# Patient Record
Sex: MALE | Race: BLACK OR AFRICAN AMERICAN | Employment: UNEMPLOYED | URBAN - METROPOLITAN AREA
[De-identification: names, ages, dates, MRNs, and addresses within clinical notes are randomized per-mention and may not be internally consistent; named-entity substitution may affect disease eponyms.]

---

## 2019-02-24 ENCOUNTER — APPOINTMENT (OUTPATIENT)
Dept: CT IMAGING | Age: 54
DRG: 309 | End: 2019-02-24
Attending: EMERGENCY MEDICINE
Payer: OTHER GOVERNMENT

## 2019-02-24 ENCOUNTER — HOSPITAL ENCOUNTER (INPATIENT)
Age: 54
LOS: 4 days | Discharge: HOME | DRG: 309 | End: 2019-02-28
Attending: EMERGENCY MEDICINE | Admitting: INTERNAL MEDICINE
Payer: OTHER GOVERNMENT

## 2019-02-24 ENCOUNTER — APPOINTMENT (OUTPATIENT)
Dept: ULTRASOUND IMAGING | Age: 54
DRG: 309 | End: 2019-02-24
Attending: INTERNAL MEDICINE
Payer: OTHER GOVERNMENT

## 2019-02-24 DIAGNOSIS — R07.9 CHEST PAIN, UNSPECIFIED TYPE: ICD-10-CM

## 2019-02-24 DIAGNOSIS — N17.9 ACUTE RENAL FAILURE, UNSPECIFIED ACUTE RENAL FAILURE TYPE (HCC): ICD-10-CM

## 2019-02-24 DIAGNOSIS — I48.92 ATRIAL FLUTTER, UNSPECIFIED TYPE (HCC): Primary | ICD-10-CM

## 2019-02-24 DIAGNOSIS — R55 SYNCOPE AND COLLAPSE: ICD-10-CM

## 2019-02-24 PROBLEM — I10 UNCONTROLLED HYPERTENSION: Chronic | Status: ACTIVE | Noted: 2019-02-24

## 2019-02-24 LAB
ALBUMIN SERPL-MCNC: 3.3 G/DL (ref 3.5–5)
ALBUMIN/GLOB SERPL: 0.7 {RATIO} (ref 1.1–2.2)
ALP SERPL-CCNC: 81 U/L (ref 45–117)
ALT SERPL-CCNC: 42 U/L (ref 12–78)
AMPHET UR QL SCN: NEGATIVE
ANION GAP SERPL CALC-SCNC: 8 MMOL/L (ref 5–15)
APPEARANCE UR: CLEAR
AST SERPL-CCNC: 21 U/L (ref 15–37)
BACTERIA URNS QL MICRO: NEGATIVE /HPF
BARBITURATES UR QL SCN: NEGATIVE
BASOPHILS # BLD: 0.1 K/UL (ref 0–0.1)
BASOPHILS NFR BLD: 1 % (ref 0–1)
BENZODIAZ UR QL: NEGATIVE
BILIRUB SERPL-MCNC: 0.2 MG/DL (ref 0.2–1)
BILIRUB UR QL: NEGATIVE
BUN SERPL-MCNC: 16 MG/DL (ref 6–20)
BUN/CREAT SERPL: 10 (ref 12–20)
CALCIUM SERPL-MCNC: 8.6 MG/DL (ref 8.5–10.1)
CANNABINOIDS UR QL SCN: POSITIVE
CHLORIDE SERPL-SCNC: 103 MMOL/L (ref 97–108)
CO2 SERPL-SCNC: 27 MMOL/L (ref 21–32)
COCAINE UR QL SCN: NEGATIVE
COLOR UR: ABNORMAL
COMMENT, HOLDF: NORMAL
CREAT SERPL-MCNC: 1.68 MG/DL (ref 0.7–1.3)
DIFFERENTIAL METHOD BLD: NORMAL
DRUG SCRN COMMENT,DRGCM: ABNORMAL
EOSINOPHIL # BLD: 0.2 K/UL (ref 0–0.4)
EOSINOPHIL NFR BLD: 3 % (ref 0–7)
EPITH CASTS URNS QL MICRO: ABNORMAL /LPF
ERYTHROCYTE [DISTWIDTH] IN BLOOD BY AUTOMATED COUNT: 14.5 % (ref 11.5–14.5)
ETHANOL SERPL-MCNC: <10 MG/DL
GLOBULIN SER CALC-MCNC: 4.7 G/DL (ref 2–4)
GLUCOSE SERPL-MCNC: 113 MG/DL (ref 65–100)
GLUCOSE UR STRIP.AUTO-MCNC: NEGATIVE MG/DL
HCT VFR BLD AUTO: 38.2 % (ref 36.6–50.3)
HGB BLD-MCNC: 12.5 G/DL (ref 12.1–17)
HGB UR QL STRIP: NEGATIVE
HYALINE CASTS URNS QL MICRO: ABNORMAL /LPF (ref 0–5)
IMM GRANULOCYTES # BLD AUTO: 0 K/UL (ref 0–0.04)
IMM GRANULOCYTES NFR BLD AUTO: 0 % (ref 0–0.5)
KETONES UR QL STRIP.AUTO: NEGATIVE MG/DL
LEUKOCYTE ESTERASE UR QL STRIP.AUTO: ABNORMAL
LYMPHOCYTES # BLD: 2.9 K/UL (ref 0.8–3.5)
LYMPHOCYTES NFR BLD: 33 % (ref 12–49)
MAGNESIUM SERPL-MCNC: 1.9 MG/DL (ref 1.6–2.4)
MCH RBC QN AUTO: 27.8 PG (ref 26–34)
MCHC RBC AUTO-ENTMCNC: 32.7 G/DL (ref 30–36.5)
MCV RBC AUTO: 84.9 FL (ref 80–99)
METHADONE UR QL: NEGATIVE
MONOCYTES # BLD: 0.7 K/UL (ref 0–1)
MONOCYTES NFR BLD: 8 % (ref 5–13)
NEUTS SEG # BLD: 5 K/UL (ref 1.8–8)
NEUTS SEG NFR BLD: 56 % (ref 32–75)
NITRITE UR QL STRIP.AUTO: NEGATIVE
NRBC # BLD: 0 K/UL (ref 0–0.01)
NRBC BLD-RTO: 0 PER 100 WBC
OPIATES UR QL: NEGATIVE
PCP UR QL: NEGATIVE
PH UR STRIP: 6 [PH] (ref 5–8)
PLATELET # BLD AUTO: 199 K/UL (ref 150–400)
PMV BLD AUTO: 11.7 FL (ref 8.9–12.9)
POTASSIUM SERPL-SCNC: 4.3 MMOL/L (ref 3.5–5.1)
PROT SERPL-MCNC: 8 G/DL (ref 6.4–8.2)
PROT UR STRIP-MCNC: NEGATIVE MG/DL
RBC # BLD AUTO: 4.5 M/UL (ref 4.1–5.7)
RBC #/AREA URNS HPF: ABNORMAL /HPF (ref 0–5)
SAMPLES BEING HELD,HOLD: NORMAL
SODIUM SERPL-SCNC: 138 MMOL/L (ref 136–145)
SP GR UR REFRACTOMETRY: <1.005 (ref 1–1.03)
TROPONIN I BLD-MCNC: <0.04 NG/ML (ref 0–0.08)
TSH SERPL DL<=0.05 MIU/L-ACNC: 1.1 UIU/ML (ref 0.36–3.74)
UR CULT HOLD, URHOLD: NORMAL
UROBILINOGEN UR QL STRIP.AUTO: 1 EU/DL (ref 0.2–1)
WBC # BLD AUTO: 8.9 K/UL (ref 4.1–11.1)
WBC URNS QL MICRO: ABNORMAL /HPF (ref 0–4)

## 2019-02-24 PROCEDURE — 80053 COMPREHEN METABOLIC PANEL: CPT

## 2019-02-24 PROCEDURE — 87086 URINE CULTURE/COLONY COUNT: CPT

## 2019-02-24 PROCEDURE — 81001 URINALYSIS AUTO W/SCOPE: CPT

## 2019-02-24 PROCEDURE — 96374 THER/PROPH/DIAG INJ IV PUSH: CPT

## 2019-02-24 PROCEDURE — 74011250636 HC RX REV CODE- 250/636: Performed by: EMERGENCY MEDICINE

## 2019-02-24 PROCEDURE — 84439 ASSAY OF FREE THYROXINE: CPT

## 2019-02-24 PROCEDURE — 84484 ASSAY OF TROPONIN QUANT: CPT

## 2019-02-24 PROCEDURE — 99285 EMERGENCY DEPT VISIT HI MDM: CPT

## 2019-02-24 PROCEDURE — 80307 DRUG TEST PRSMV CHEM ANLYZR: CPT

## 2019-02-24 PROCEDURE — 96361 HYDRATE IV INFUSION ADD-ON: CPT

## 2019-02-24 PROCEDURE — 94761 N-INVAS EAR/PLS OXIMETRY MLT: CPT

## 2019-02-24 PROCEDURE — 85025 COMPLETE CBC W/AUTO DIFF WBC: CPT

## 2019-02-24 PROCEDURE — 36415 COLL VENOUS BLD VENIPUNCTURE: CPT

## 2019-02-24 PROCEDURE — 70450 CT HEAD/BRAIN W/O DYE: CPT

## 2019-02-24 PROCEDURE — 65660000000 HC RM CCU STEPDOWN

## 2019-02-24 PROCEDURE — 83735 ASSAY OF MAGNESIUM: CPT

## 2019-02-24 PROCEDURE — 74011250636 HC RX REV CODE- 250/636: Performed by: INTERNAL MEDICINE

## 2019-02-24 PROCEDURE — 93005 ELECTROCARDIOGRAM TRACING: CPT

## 2019-02-24 PROCEDURE — 74011250637 HC RX REV CODE- 250/637: Performed by: INTERNAL MEDICINE

## 2019-02-24 PROCEDURE — 74011636320 HC RX REV CODE- 636/320: Performed by: RADIOLOGY

## 2019-02-24 PROCEDURE — 96365 THER/PROPH/DIAG IV INF INIT: CPT

## 2019-02-24 PROCEDURE — 99218 HC RM OBSERVATION: CPT

## 2019-02-24 PROCEDURE — 84443 ASSAY THYROID STIM HORMONE: CPT

## 2019-02-24 PROCEDURE — 71275 CT ANGIOGRAPHY CHEST: CPT

## 2019-02-24 RX ORDER — ENOXAPARIN SODIUM 150 MG/ML
1 INJECTION SUBCUTANEOUS
Status: COMPLETED | OUTPATIENT
Start: 2019-02-24 | End: 2019-02-24

## 2019-02-24 RX ORDER — MAGNESIUM SULFATE HEPTAHYDRATE 40 MG/ML
2 INJECTION, SOLUTION INTRAVENOUS
Status: COMPLETED | OUTPATIENT
Start: 2019-02-24 | End: 2019-02-24

## 2019-02-24 RX ORDER — SODIUM CHLORIDE 0.9 % (FLUSH) 0.9 %
5-40 SYRINGE (ML) INJECTION EVERY 8 HOURS
Status: DISCONTINUED | OUTPATIENT
Start: 2019-02-24 | End: 2019-02-28 | Stop reason: HOSPADM

## 2019-02-24 RX ORDER — ASPIRIN 81 MG/1
81 TABLET ORAL DAILY
Status: DISCONTINUED | OUTPATIENT
Start: 2019-02-24 | End: 2019-02-28 | Stop reason: HOSPADM

## 2019-02-24 RX ORDER — LABETALOL HCL 20 MG/4 ML
20 SYRINGE (ML) INTRAVENOUS
Status: DISCONTINUED | OUTPATIENT
Start: 2019-02-24 | End: 2019-02-28 | Stop reason: HOSPADM

## 2019-02-24 RX ORDER — SODIUM CHLORIDE 0.9 % (FLUSH) 0.9 %
5-40 SYRINGE (ML) INJECTION AS NEEDED
Status: DISCONTINUED | OUTPATIENT
Start: 2019-02-24 | End: 2019-02-28 | Stop reason: HOSPADM

## 2019-02-24 RX ORDER — AMLODIPINE BESYLATE 5 MG/1
5 TABLET ORAL DAILY
Status: DISCONTINUED | OUTPATIENT
Start: 2019-02-24 | End: 2019-02-25

## 2019-02-24 RX ADMIN — IOPAMIDOL 100 ML: 755 INJECTION, SOLUTION INTRAVENOUS at 21:09

## 2019-02-24 RX ADMIN — ENOXAPARIN SODIUM 120 MG: 120 INJECTION SUBCUTANEOUS at 22:49

## 2019-02-24 RX ADMIN — LABETALOL 20 MG/4 ML (5 MG/ML) INTRAVENOUS SYRINGE 20 MG: at 22:49

## 2019-02-24 RX ADMIN — MAGNESIUM SULFATE HEPTAHYDRATE 2 G: 40 INJECTION, SOLUTION INTRAVENOUS at 20:58

## 2019-02-24 RX ADMIN — AMLODIPINE BESYLATE 5 MG: 5 TABLET ORAL at 22:48

## 2019-02-24 RX ADMIN — ASPIRIN 81 MG: 81 TABLET ORAL at 22:22

## 2019-02-24 RX ADMIN — SODIUM CHLORIDE 1000 ML: 900 INJECTION, SOLUTION INTRAVENOUS at 20:29

## 2019-02-25 ENCOUNTER — APPOINTMENT (OUTPATIENT)
Dept: ULTRASOUND IMAGING | Age: 54
DRG: 309 | End: 2019-02-25
Attending: INTERNAL MEDICINE
Payer: OTHER GOVERNMENT

## 2019-02-25 ENCOUNTER — APPOINTMENT (OUTPATIENT)
Dept: MRI IMAGING | Age: 54
DRG: 309 | End: 2019-02-25
Attending: NURSE PRACTITIONER
Payer: OTHER GOVERNMENT

## 2019-02-25 ENCOUNTER — APPOINTMENT (OUTPATIENT)
Dept: NON INVASIVE DIAGNOSTICS | Age: 54
DRG: 309 | End: 2019-02-25
Attending: INTERNAL MEDICINE
Payer: OTHER GOVERNMENT

## 2019-02-25 ENCOUNTER — APPOINTMENT (OUTPATIENT)
Dept: VASCULAR SURGERY | Age: 54
DRG: 309 | End: 2019-02-25
Attending: INTERNAL MEDICINE
Payer: OTHER GOVERNMENT

## 2019-02-25 LAB
ALBUMIN SERPL-MCNC: 3.4 G/DL (ref 3.5–5)
ALBUMIN/GLOB SERPL: 0.7 {RATIO} (ref 1.1–2.2)
ALP SERPL-CCNC: 92 U/L (ref 45–117)
ALT SERPL-CCNC: 43 U/L (ref 12–78)
ANION GAP SERPL CALC-SCNC: 15 MMOL/L (ref 5–15)
APTT PPP: 32.5 SEC (ref 22.1–32)
AST SERPL-CCNC: 24 U/L (ref 15–37)
ATRIAL RATE: 234 BPM
BILIRUB DIRECT SERPL-MCNC: <0.1 MG/DL (ref 0–0.2)
BILIRUB SERPL-MCNC: 0.2 MG/DL (ref 0.2–1)
BUN SERPL-MCNC: 15 MG/DL (ref 6–20)
BUN/CREAT SERPL: 11 (ref 12–20)
CALCIUM SERPL-MCNC: 8.5 MG/DL (ref 8.5–10.1)
CALCULATED P AXIS, ECG09: -83 DEGREES
CALCULATED R AXIS, ECG10: 40 DEGREES
CALCULATED T AXIS, ECG11: -7 DEGREES
CHLORIDE SERPL-SCNC: 102 MMOL/L (ref 97–108)
CHOLEST SERPL-MCNC: 184 MG/DL
CK MB CFR SERPL CALC: NORMAL % (ref 0–2.5)
CK MB SERPL-MCNC: <1 NG/ML (ref 5–25)
CK SERPL-CCNC: 125 U/L (ref 39–308)
CO2 SERPL-SCNC: 21 MMOL/L (ref 21–32)
COMMENT, HOLDF: NORMAL
CREAT SERPL-MCNC: 1.41 MG/DL (ref 0.7–1.3)
CREAT UR-MCNC: 110.3 MG/DL
DIAGNOSIS, 93000: NORMAL
ERYTHROCYTE [DISTWIDTH] IN BLOOD BY AUTOMATED COUNT: 14.6 % (ref 11.5–14.5)
EST. AVERAGE GLUCOSE BLD GHB EST-MCNC: 131 MG/DL
GLOBULIN SER CALC-MCNC: 4.8 G/DL (ref 2–4)
GLUCOSE SERPL-MCNC: 127 MG/DL (ref 65–100)
HBA1C MFR BLD: 6.2 % (ref 4.2–6.3)
HCT VFR BLD AUTO: 39 % (ref 36.6–50.3)
HDLC SERPL-MCNC: 45 MG/DL
HDLC SERPL: 4.1 {RATIO} (ref 0–5)
HGB BLD-MCNC: 12.8 G/DL (ref 12.1–17)
INR PPP: 1.1 (ref 0.9–1.1)
LDLC SERPL CALC-MCNC: 104 MG/DL (ref 0–100)
LIPID PROFILE,FLP: ABNORMAL
MAGNESIUM SERPL-MCNC: 2.3 MG/DL (ref 1.6–2.4)
MCH RBC QN AUTO: 27.9 PG (ref 26–34)
MCHC RBC AUTO-ENTMCNC: 32.8 G/DL (ref 30–36.5)
MCV RBC AUTO: 85 FL (ref 80–99)
NRBC # BLD: 0 K/UL (ref 0–0.01)
NRBC BLD-RTO: 0 PER 100 WBC
PHOSPHATE SERPL-MCNC: 3.8 MG/DL (ref 2.6–4.7)
PLATELET # BLD AUTO: 223 K/UL (ref 150–400)
PMV BLD AUTO: 12.1 FL (ref 8.9–12.9)
POTASSIUM SERPL-SCNC: 3.8 MMOL/L (ref 3.5–5.1)
PROT SERPL-MCNC: 8.2 G/DL (ref 6.4–8.2)
PROTHROMBIN TIME: 11.2 SEC (ref 9–11.1)
Q-T INTERVAL, ECG07: 386 MS
QRS DURATION, ECG06: 90 MS
QTC CALCULATION (BEZET), ECG08: 428 MS
RBC # BLD AUTO: 4.59 M/UL (ref 4.1–5.7)
SAMPLES BEING HELD,HOLD: NORMAL
SODIUM SERPL-SCNC: 138 MMOL/L (ref 136–145)
SODIUM UR-SCNC: 100 MMOL/L
T4 FREE SERPL-MCNC: 1 NG/DL (ref 0.8–1.5)
THERAPEUTIC RANGE,PTTT: ABNORMAL SECS (ref 58–77)
TRIGL SERPL-MCNC: 175 MG/DL (ref ?–150)
TROPONIN I SERPL-MCNC: <0.05 NG/ML
VENTRICULAR RATE, ECG03: 74 BPM
VLDLC SERPL CALC-MCNC: 35 MG/DL
WBC # BLD AUTO: 14.5 K/UL (ref 4.1–11.1)

## 2019-02-25 PROCEDURE — 95951 EEG 24 HR W/ VIDEO: CPT | Performed by: NURSE PRACTITIONER

## 2019-02-25 PROCEDURE — 80061 LIPID PANEL: CPT

## 2019-02-25 PROCEDURE — 74011250637 HC RX REV CODE- 250/637: Performed by: INTERNAL MEDICINE

## 2019-02-25 PROCEDURE — 84300 ASSAY OF URINE SODIUM: CPT

## 2019-02-25 PROCEDURE — 70551 MRI BRAIN STEM W/O DYE: CPT

## 2019-02-25 PROCEDURE — 80048 BASIC METABOLIC PNL TOTAL CA: CPT

## 2019-02-25 PROCEDURE — 36415 COLL VENOUS BLD VENIPUNCTURE: CPT

## 2019-02-25 PROCEDURE — 74011000250 HC RX REV CODE- 250: Performed by: INTERNAL MEDICINE

## 2019-02-25 PROCEDURE — 97165 OT EVAL LOW COMPLEX 30 MIN: CPT

## 2019-02-25 PROCEDURE — 74011250636 HC RX REV CODE- 250/636: Performed by: INTERNAL MEDICINE

## 2019-02-25 PROCEDURE — 80076 HEPATIC FUNCTION PANEL: CPT

## 2019-02-25 PROCEDURE — 93880 EXTRACRANIAL BILAT STUDY: CPT

## 2019-02-25 PROCEDURE — 84484 ASSAY OF TROPONIN QUANT: CPT

## 2019-02-25 PROCEDURE — 85730 THROMBOPLASTIN TIME PARTIAL: CPT

## 2019-02-25 PROCEDURE — 82550 ASSAY OF CK (CPK): CPT

## 2019-02-25 PROCEDURE — 97161 PT EVAL LOW COMPLEX 20 MIN: CPT

## 2019-02-25 PROCEDURE — 76770 US EXAM ABDO BACK WALL COMP: CPT

## 2019-02-25 PROCEDURE — 85027 COMPLETE CBC AUTOMATED: CPT

## 2019-02-25 PROCEDURE — 83036 HEMOGLOBIN GLYCOSYLATED A1C: CPT

## 2019-02-25 PROCEDURE — 85610 PROTHROMBIN TIME: CPT

## 2019-02-25 PROCEDURE — 65610000006 HC RM INTENSIVE CARE

## 2019-02-25 PROCEDURE — 83735 ASSAY OF MAGNESIUM: CPT

## 2019-02-25 PROCEDURE — 74011000258 HC RX REV CODE- 258: Performed by: INTERNAL MEDICINE

## 2019-02-25 PROCEDURE — 84100 ASSAY OF PHOSPHORUS: CPT

## 2019-02-25 PROCEDURE — 82570 ASSAY OF URINE CREATININE: CPT

## 2019-02-25 RX ORDER — HYDROMORPHONE HYDROCHLORIDE 2 MG/ML
0.5 INJECTION, SOLUTION INTRAMUSCULAR; INTRAVENOUS; SUBCUTANEOUS
Status: DISCONTINUED | OUTPATIENT
Start: 2019-02-25 | End: 2019-02-28 | Stop reason: HOSPADM

## 2019-02-25 RX ORDER — SODIUM CHLORIDE 0.9 % (FLUSH) 0.9 %
5-40 SYRINGE (ML) INJECTION AS NEEDED
Status: DISCONTINUED | OUTPATIENT
Start: 2019-02-25 | End: 2019-02-28 | Stop reason: HOSPADM

## 2019-02-25 RX ORDER — SODIUM CHLORIDE 9 MG/ML
25 INJECTION, SOLUTION INTRAVENOUS CONTINUOUS
Status: DISCONTINUED | OUTPATIENT
Start: 2019-02-25 | End: 2019-02-28

## 2019-02-25 RX ORDER — DOCUSATE SODIUM 100 MG/1
100 CAPSULE, LIQUID FILLED ORAL 2 TIMES DAILY
Status: DISCONTINUED | OUTPATIENT
Start: 2019-02-25 | End: 2019-02-28 | Stop reason: HOSPADM

## 2019-02-25 RX ORDER — IBUPROFEN 200 MG
1 TABLET ORAL DAILY
Status: DISCONTINUED | OUTPATIENT
Start: 2019-02-25 | End: 2019-02-28 | Stop reason: HOSPADM

## 2019-02-25 RX ORDER — ACETAMINOPHEN 325 MG/1
650 TABLET ORAL
Status: DISCONTINUED | OUTPATIENT
Start: 2019-02-25 | End: 2019-02-28 | Stop reason: HOSPADM

## 2019-02-25 RX ORDER — CARVEDILOL 3.12 MG/1
3.12 TABLET ORAL 2 TIMES DAILY WITH MEALS
Status: DISCONTINUED | OUTPATIENT
Start: 2019-02-25 | End: 2019-02-27

## 2019-02-25 RX ORDER — AMLODIPINE BESYLATE 5 MG/1
10 TABLET ORAL DAILY
Status: DISCONTINUED | OUTPATIENT
Start: 2019-02-25 | End: 2019-02-28 | Stop reason: HOSPADM

## 2019-02-25 RX ORDER — SODIUM CHLORIDE 0.9 % (FLUSH) 0.9 %
5-40 SYRINGE (ML) INJECTION EVERY 8 HOURS
Status: DISCONTINUED | OUTPATIENT
Start: 2019-02-25 | End: 2019-02-28 | Stop reason: HOSPADM

## 2019-02-25 RX ORDER — HYDRALAZINE HYDROCHLORIDE 20 MG/ML
10 INJECTION INTRAMUSCULAR; INTRAVENOUS
Status: DISCONTINUED | OUTPATIENT
Start: 2019-02-25 | End: 2019-02-28 | Stop reason: HOSPADM

## 2019-02-25 RX ORDER — NALOXONE HYDROCHLORIDE 0.4 MG/ML
0.4 INJECTION, SOLUTION INTRAMUSCULAR; INTRAVENOUS; SUBCUTANEOUS AS NEEDED
Status: DISCONTINUED | OUTPATIENT
Start: 2019-02-25 | End: 2019-02-28 | Stop reason: HOSPADM

## 2019-02-25 RX ORDER — DIPHENHYDRAMINE HYDROCHLORIDE 50 MG/ML
12.5 INJECTION, SOLUTION INTRAMUSCULAR; INTRAVENOUS
Status: DISCONTINUED | OUTPATIENT
Start: 2019-02-25 | End: 2019-02-28 | Stop reason: HOSPADM

## 2019-02-25 RX ORDER — HYDRALAZINE HYDROCHLORIDE 20 MG/ML
INJECTION INTRAMUSCULAR; INTRAVENOUS
Status: DISPENSED
Start: 2019-02-25 | End: 2019-02-25

## 2019-02-25 RX ORDER — ONDANSETRON 2 MG/ML
4 INJECTION INTRAMUSCULAR; INTRAVENOUS
Status: DISCONTINUED | OUTPATIENT
Start: 2019-02-25 | End: 2019-02-28 | Stop reason: HOSPADM

## 2019-02-25 RX ORDER — HYDROCODONE BITARTRATE AND ACETAMINOPHEN 5; 325 MG/1; MG/1
1 TABLET ORAL
Status: DISCONTINUED | OUTPATIENT
Start: 2019-02-25 | End: 2019-02-28 | Stop reason: HOSPADM

## 2019-02-25 RX ADMIN — SODIUM CHLORIDE 75 ML/HR: 900 INJECTION, SOLUTION INTRAVENOUS at 18:23

## 2019-02-25 RX ADMIN — LABETALOL 20 MG/4 ML (5 MG/ML) INTRAVENOUS SYRINGE 20 MG: at 18:57

## 2019-02-25 RX ADMIN — DOCUSATE SODIUM 100 MG: 100 CAPSULE, LIQUID FILLED ORAL at 18:22

## 2019-02-25 RX ADMIN — CARVEDILOL 3.12 MG: 3.12 TABLET, FILM COATED ORAL at 18:22

## 2019-02-25 RX ADMIN — Medication 10 ML: at 00:21

## 2019-02-25 RX ADMIN — CEFTRIAXONE SODIUM 1 G: 1 INJECTION, POWDER, FOR SOLUTION INTRAMUSCULAR; INTRAVENOUS at 09:19

## 2019-02-25 RX ADMIN — SODIUM CHLORIDE 75 ML/HR: 900 INJECTION, SOLUTION INTRAVENOUS at 00:22

## 2019-02-25 RX ADMIN — CARVEDILOL 3.12 MG: 3.12 TABLET, FILM COATED ORAL at 14:24

## 2019-02-25 RX ADMIN — Medication 10 ML: at 21:03

## 2019-02-25 RX ADMIN — NITROGLYCERIN 1 INCH: 20 OINTMENT TOPICAL at 15:42

## 2019-02-25 RX ADMIN — AMLODIPINE BESYLATE 10 MG: 5 TABLET ORAL at 15:03

## 2019-02-25 RX ADMIN — SODIUM CHLORIDE 5 MG/HR: 900 INJECTION, SOLUTION INTRAVENOUS at 19:32

## 2019-02-25 RX ADMIN — HYDRALAZINE HYDROCHLORIDE 10 MG: 20 INJECTION INTRAMUSCULAR; INTRAVENOUS at 09:16

## 2019-02-25 RX ADMIN — HYDRALAZINE HYDROCHLORIDE 10 MG: 20 INJECTION INTRAMUSCULAR; INTRAVENOUS at 14:39

## 2019-02-25 NOTE — PROGRESS NOTES
[facility]: MED RECONCILIATION Comments/Recommendations:  
· Patient able to confirm name, , allergies, and preferred pharmacy · Patient reports no prescription or OTC medications Medications added:  
 
· None Medications removed: 
 
· None Medications adjusted: 
 
· None Information obtained from: Patient Allergies: Patient has no known allergies. Prior to Admission Medications:  
None Valentino Gerhard, Pharm. D. [address] [place] [address] Dr [facility]

## 2019-02-25 NOTE — PROGRESS NOTES
02/25/19 1:15 PM 
.Reason for Admission:   Syncope & collapse, a-fib RRAT Score:          4 Plan for utilizing home health:      None indicated at this time Likelihood of Readmission:  Low Transition of Care Plan:     CM met with patient. Patient lives alone in a 1 level home with no steps to enter. PTA, no DME and no HH. Independent with all needs and drives self. Has mother Zunilda Hagan [phone]) and girlfriend to assist at home and drive home at discharge. PT/OT recommending no needs for discharge. Patient is self pay and noted that he typically pays cash for all prescriptions and uses [address] at [facility]. Has been started on Norvasc and Coreg, both of which are on [employer] [facility] $4 prescription list.  Patient refused list offered to him by CM for PCP. Care Management Interventions PCP Verified by CM: Yes(uses urgent care, declined [address] list) Mode of Transport at Discharge: Self Transition of Care Consult (CM Consult): Discharge Planning Current Support Network: Lives Alone, Family Lives [place] Confirm Follow Up Transport: Family Plan discussed with Pt/Family/Caregiver: Yes Freedom of Choice Offered: Yes Discharge Location Discharge Placement: Home with outpatient services LESA Rodriguez

## 2019-02-25 NOTE — CONSULTS
[facility]    Name: Kelli Rodriges      Admitted: 2019  MRN #: [id number]      : 1965  Account #: [de-identified]     Age: 48 y.o. Location:[address]   Physician: Samson Lucia MD         REASON FOR ADMISSION:  Principal Problem:    New onset atrial flutter ([facility] [address] (2019)    Active Problems:    Syncope and collapse (2019)      RAJ (acute kidney injury) ([facility] [address] (2019)      Uncontrolled hypertension (2019)        HISTORY OF PRESENT ILLNESS:   Mr. Pritesh Maurice is a 47 y/o M with PMH listed below who presented yesterday with syncope. He passed out for about few minutes while he was sitting watching TV. Pt was found to have A flutter with HR in 70s. Pt denies chest pain, SOB or palpitations. Labs were notable for elevated Cr at 1.68 (baseline Cr is unknown). Pt was started on 0.9%NS @75 cc/hr. Cr was down to 1.41 today in AM. Pt denies any history of kidney disease. He reports feeling well overall. Denies hematuria, frothy urine or urinary retention. No nausea, vomiting or diarrhea. PO intake is good. No rash, fever, hemoptysis or sinusitis. No NSAIDs use. Pt underwent CT chest W contrast yesterday which was neg, Nephrology service consulted for RAJ management. PAST MEDICAL HISTORY:   A Flutter   HTN       MEDICATIONS ON ADMISSION:  Prior to Admission medications    Not on File       ALLERGIES:   No Known Allergies    SOCIAL HISTORY:   Every day smoker       FAMILY HISTORY:   Mother and sister had nephrectomy   No family members on RRT     REVIEW OF SYSTEMS:   No nausea or vomiting   No diarrhea     All other 12 systems were reviewed and found neg     PHYSICAL EXAMINATION:      GENERAL:   He looks ill. He a 48 y.o.  male. VITAL SIGNS:   The patients  height is 6' 2.5\" (1.892 m) and weight is 124.7 kg (275 lb). His temperature is 97.9 °F (36.6 °C). His blood pressure is 149/105 (abnormal) and his pulse is 76. His respiration is 22 and oxygen saturation is 92%. He is afebrile. General:          Lying in bed comfortably. His girlfriend was present       Head and neck:        Normocephalic, no JVD   . Eyes:               No icterus. .  Lungs:             No rales, no wheezes         Heart:             Irregular rhythm ,  no S 3  Gallop , no pericardial rub. Taylor Tulio Extremities:     No edema or cyanosis     Abdomen: Soft, non tender, BS normal         Psych:            Calm,  Responding appropriately     Neuro:            A&O x3  , no focal deficits                LABORATORY DATA:       CBC W/O DIFF    Collection Time: 02/25/19  2:56 AM   Result Value Ref Range    WBC 14.5 (H) 4.1 - 11.1 K/uL    RBC 4.59 4. 10 - 5.70 M/uL    HGB 12.8 12.1 - 17.0 g/dL    HCT 39.0 36.6 - 50.3 %    MCV 85.0 80.0 - 99.0 FL    MCH 27.9 26.0 - 34.0 PG    MCHC 32.8 30.0 - 36.5 g/dL    RDW 14.6 (H) 11.5 - 14.5 %    PLATELET 374 558 - 601 K/uL    MPV 12.1 8.9 - 12.9 FL    NRBC 0.0 0  WBC    ABSOLUTE NRBC 0.00 0.00 - 0.01 K/uL     BMP:   Lab Results   Component Value Date/Time     02/25/2019 02:56 AM    K 3.8 02/25/2019 02:56 AM     02/25/2019 02:56 AM    CO2 21 02/25/2019 02:56 AM    AGAP 15 02/25/2019 02:56 AM     (H) 02/25/2019 02:56 AM    BUN 15 02/25/2019 02:56 AM    CREA 1.41 (H) 02/25/2019 02:56 AM    GFRAA >60 02/25/2019 02:56 AM    GFRNA 53 (L) 02/25/2019 02:56 AM        RADIOLOGY:   EXAM: CTA CHEST W OR W WO CONT     INDICATION: syncope     COMPARISON: None.     CONTRAST: 100 mL of Isovue-370.     TECHNIQUE:   Precontrast  images were obtained to localize the volume for acquisition. Multislice helical CT arteriography was performed from the diaphragm to the  thoracic inlet during uneventful rapid bolus intravenous contrast  administration. Lung and soft tissue windows were generated.   Coronal and  sagittal images were generated and 3D post processing consisting of coronal  maximum intensity images was performed. CT dose reduction was achieved through  use of a standardized protocol tailored for this examination and automatic  exposure control for dose modulation.     FINDINGS:  The lungs are clear of mass, nodule, airspace disease or edema. Emphysematous  changes most pronounced at the lung apices.     The pulmonary arteries are well enhanced and no pulmonary emboli are identified.     There is no mediastinal or hilar adenopathy or mass. The aorta enhances normally  without evidence of aneurysm or dissection.     The visualized portions of the upper abdominal organs are normal.     IMPRESSION  IMPRESSION: No evidence for pulmonary embolism. Emphysema. IMPRESSION:  RAJ vs CKD     A flutter     HTN     PLAN:     Baseline kidney function in unknown. Cr 1.68 on presentation.  Unlcear if pt has baseline CKD     FeNa 0.9% and Kidney function is improving with IVF  which supports the evidence of RAJ d/t volume depletion, however SG is low on UA (obtained after IVF was started?)     Please obtain US renal     Obtain  PTH and 25 Vit D     Avoid nephrotoxics including NSAIDs     Watch for JOSIE (Pt received IV dye yesterday)     Continue IVF for now     Strict I&O    BMP daily     Dose all meds to current GFR     Tommy Bell MD  2/25/2019

## 2019-02-25 NOTE — PROGRESS NOTES
Bedside and Verbal shift change report given to Mirna Ibrahim (oncoming nurse) by Leidy Grove (offgoing nurse). Report included the following information SBAR, ED Summary, Intake/Output, MAR, Recent Results and Cardiac Rhythm a flutter.

## 2019-02-25 NOTE — PROGRESS NOTES
Occupational Therapy EVALUATION: discharge Patient: Magdalena Figueroa (83 y.o. male) Date: 2/25/2019 Primary Diagnosis: Syncope and collapse [R55] New onset atrial flutter ([facility] [address] [I48.92] Precautions:    
 
ASSESSMENT : 
Based on the objective data described below, the patient presents with Independent upper body ADLs, Independent lower body ADLs, and Modified independent assist functional mobility. Pt performed bed mobility and ambulation in hallway independently, no LOB observed. UE ROM and strength intact. Performed LB dressing ADL while seated with no difficulty. Pt had no further questions or concerns for OT, feels he is at his functional baseline for ADLs and transfers. The following are barriers to independence while in acute care:  
- Cognitive and/or behavioral: none - Medical condition: none   
- Other:    
 
Discharge recommendations: None Equipment recommendations for successful discharge (if) home: none PLAN : 
Further skilled acute occupational therapy is not indicated at this time. Discharging occupational therapy. SUBJECTIVE:  
Patient stated I don't think I need therapy.  OBJECTIVE DATA SUMMARY:  
HISTORY:  
Past Medical History:  
Diagnosis Date  Hypertension  Palpitations No past surgical history on file. Prior Level of Function/Environment/Context: Pt is I with ADLs, IADLs and functional mobility at baseline. Home Situation Home Environment: Private residence # Steps to Enter: 0 One/Two Story Residence: One story Living Alone: Yes Support Systems: Family member(s) Patient Expects to be Discharged to[de-identified] Private residence Current DME Used/Available at Home: None Tub or Shower Type: Shower Hand dominance: Right EXAMINATION OF PERFORMANCE DEFICITS: 
Cognitive/Behavioral Status: 
Neurologic State: Alert Orientation Level: Oriented X4 Cognition: Follows commands Perception: Appears intact Perseveration: No perseveration noted Safety/Judgement: Awareness of environment; Fall prevention Hearing: Auditory Auditory Impairment: None Vision/Perceptual:   
    
      
Acuity: Within Defined Limits Range of Motion: 
AROM: Within functional limits PROM: Within functional limits Strength: 
Strength: Within functional limits Coordination: 
Coordination: Within functional limits Fine Motor Skills-Upper: Left Intact; Right Intact Gross Motor Skills-Upper: Left Intact; Right Intact Tone & Sensation: 
Tone: Normal 
Sensation: Intact Balance: 
Sitting: Intact Standing: Intact Functional Mobility and Transfers for ADLs:Bed Mobility: 
Supine to Sit: Independent Transfers: 
Sit to Stand: Modified independent Stand to Sit: Modified independent ADL Assessment: 
Feeding: Independent Oral Facial Hygiene/Grooming: Independent Bathing: Independent Upper Body Dressing: Independent Lower Body Dressing: Independent Toileting: Independent ADL Intervention and task modifications: 
  
 
Grooming Washing Hands: Independent Lower Body Dressing Assistance Socks: Modified independent Leg Crossed Method Used: Yes Position Performed: Seated in chair Cognitive Retraining Safety/Judgement: Awareness of environment; Fall prevention Functional Measure: 
Barthel Index: 
 
Bathin Bladder: 10 Bowels: 10 
Groomin Dressing: 10 Feeding: 10 Mobility: 15 
Stairs: 10 Toilet Use: 10 Transfer (Bed to Chair and Back): 15 Total: 100 Indicating 0% impairment in basic ADL ability. The Barthel ADL Index: Guidelines 1. The index should be used as a record of what a patient does, not as a record of what a patient could do. 2. The main aim is to establish degree of independence from any help, physical or verbal, however minor and for whatever reason. 3. The need for supervision renders the patient not independent. 4. A patient's performance should be established using the best available evidence. Asking the patient, friends/relatives and nurses are the usual sources, but direct observation and common sense are also important. However direct testing is not needed. 5. Usually the patient's performance over the preceding 24-48 hours is important, but occasionally longer periods will be relevant. 6. Middle categories imply that the patient supplies over 50 per cent of the effort. 7. Use of aids to be independent is allowed. Pro Cristobal., Barthel, D.W. (1795). Functional evaluation: the Barthel Index. [facility] (14)2. Trent Campos geetha ZAN Ackerman, Jesus Castorena., Lenord Shone., [place], [address] (1999). Measuring the change indisability after inpatient rehabilitation; comparison of the responsiveness of the Barthel Index and Functional [place] Measure. Journal of Neurology, Neurosurgery, and Psychiatry, 66(4), 816-429. Caterina Mccormick, [place].A, NEYDA Allen.FILIBERTO, & Radha Mchugh M.A. (2004.) Assessment of post-stroke quality of life in cost-effectiveness studies: The usefulness of the Barthel Index and the EuroQoL-5D. [facility], 13, 304-85 Occupational Therapy Evaluation Charge Determination History Examination Decision-Making LOW Complexity : Brief history review  LOW Complexity : 1-3 performance deficits relating to physical, cognitive , or psychosocial skils that result in activity limitations and / or participation restrictions  LOW Complexity : No comorbidities that affect functional and no verbal or physical assistance needed to complete eval tasks Based on the above components, the patient evaluation is determined to be of the following complexity level: LOW Pain: 
Pre treatment: 0 /10 During treatment: 0/10 Activity Tolerance: WNL Please refer to the flowsheet for vital signs taken during this treatment. After treatment patient left:  
Up in wheelchair with staff present COMMUNICATION/EDUCATION:  
The patients plan of care was discussed with: Physical Therapist and Registered Nurse. Thank you for this referral. 
Rick Roldan OT Time Calculation: 9 mins

## 2019-02-25 NOTE — H&P
[address], [id number] [place] [address] 
[phone] Admission History and Physical 
 
 
NAME:  Agustina Bassett :   1965 MRN:  [id number] PCP:  Rodney Almeida MD  
 
Date/Time:  2019 Subjective: CHIEF COMPLAINT: \"I passed out\" HISTORY OF PRESENT ILLNESS:    
The patient is a 47 yo hx of HTN, presented w/ syncope, new onset atrial flutter. The patient was sitting at home tonight when he \"passed out\" for several minutes. His mother found him and called EMS. He stated that he has had intermittent palpitations, but denied chest pain, SOB, fevers, chills, nausea, vomiting, orthopnea, or PND. Also denied hx of CVA. In the ED, tele showed atrial flutter, rate ~75. Head CT showed old lacunar infarct. Chest CTA neg for PE. No Known Allergies Prior to Admission medications Not on File Past Medical History:  
Diagnosis Date  Hypertension  Palpitations No past surgical history on file. Social History Tobacco Use  Smoking status: Smoker, Current Status Unknown Substance Use Topics  Alcohol use: No  
  Frequency: Never Family History Problem Relation Age of Onset  Hypertension Father  Hypertension Mother Review of Systems: 
(bold if positive, if negative) Gen:  Eyes:  ENT:  CVS:  Palpitations, syncopePulm:  GI:   
:   
MS:  Skin:  Psych:  Endo:   
Hem:  Renal:   
Neuro:    
 
  
Objective: VITALS:   
Vital signs reviewed; most recent are: 
 
Visit Vitals BP (!) 166/113 Pulse 74 Resp (!) 31 Ht 6' 2.5\" (1.892 m) Wt 124.7 kg (275 lb) SpO2 95% BMI 34.84 kg/m² SpO2 Readings from Last 6 Encounters:  
19 95% No intake or output data in the 24 hours ending 19 [id number] Exam:  
 
Physical Exam: 
 
Gen:  Well-developed, well-nourished, morbidly obese, in no acute distress HEENT:  Pink conjunctivae, PERRL, hearing intact to voice, moist mucous membranes Neck:  Supple, without masses, thyroid non-tender Resp:  No accessory muscle use, clear breath sounds without wheezes rales or rhonchi 
Card:  Irregular, no murmurs, normal S1, S2 without thrills, bruits or peripheral edema Abd:  Soft, non-tender, non-distended, normoactive bowel sounds are present Lymph:  No cervical adenopathy Musc:  No cyanosis or clubbing Skin:  No rashes Neuro:  Cranial nerves 3-12 are grossly intact, follows commands appropriately Psych:  Alert with good insight. Oriented to person, place, and time Labs: 
 
Recent Labs  
  02/24/19 2019 WBC 8.9 HGB 12.5 HCT 38.2  Recent Labs  
  02/24/19 2019   
K 4.3  CO2 27 * BUN 16  
CREA 1.68* CA 8.6 MG 1.9 ALB 3.3* TBILI 0.2 SGOT 21 ALT 42 No results found for: Altamease Patience No results for input(s): PH, PCO2, PO2, HCO3, FIO2 in the last 72 hours. No results for input(s): INR in the last 72 hours. No lab exists for component: INREXT Radiology and EKG reviewed:   Head CT w/ old CVA **Old Records reviewed in [facility] [employer]** Assessment/Plan:   
  
Principal Problem: 
 
47 yo hx of HTN, presented w/ syncope, new onset atrial flutter 1) New onset atrial flutter: rate controlled. Chest CTA neg for PE. Will monitor on Tele. Check Echo, TSH. Patient already received treatment dose lovenox in ED. Will consult Cards/EP for cardioversion. Defer further anticoagulation to Cards 2) Syncope and collapse: occurred once. Likely due to atrial flutter. Head CT w/ old lacunar CVA, likely related to flutter. Will monitor on Tele. Echo pending. Will also check carotid dopplers. Start ASA. Consult Neuro 3) RAJ (acute kidney injury): unclear baseline. Likely has CKD. Will check renal U/S, lytes. Consult renal 
 
4) Uncontrolled hypertension: not on meds prior to admission.   Will start norvasc, IV labetalol prn Code: Full Risk of deterioration: high Total time spent with patient: 70 Minutes Care Plan discussed with: Patient, nursing, family Discussed:  Care Plan Prophylaxis:  SCD's, lovenox Probable Disposition:  Home w/Family 
        
___________________________________________________ Attending Physician: Haleigh Gray MD

## 2019-02-25 NOTE — CONSULTS
[facility]: [id number] [address] Neurology  [place] [address]  [phone]        Name:   Marcelina Chavira   Medical record #: [id number]  Admission Date: 2/24/2019   Who Consulted:  Dr. Catherine Ruiz  Reason for Consult:   Syncope    HISTORY OF PRESENT ILLNESS   This is a 48 y.o. male with  has a past medical history of Hypertension and Palpitations. who is admitted for Syncope. The Neurology Service is asked to evaluate for Syncope verus seizure. Mr. Chun Vargas states that he was watching TV while using cannabis  when he suddenly became unconscious. Patient's syncopal episode was witnessed by family, who in-turn called [id number]. Per EMS, patient's HR was in the 35s when they arrived on the scene. He was also incontinent of urine with a GCS of 10, and EMS states that patient's residence had a strong smell of cannabis. Upon arrival he was found to be in atrial flutter. Upon interview he states that he does not remember the incident and does not have any problems. He denies drug use and wishes for discharge. Clinical Data  Imaging review:      CT Head:  White matter disease. Old lacunar infarcts. No acute intracranial hemorrhage identified. MRI brain: Negative  Dopplers: Less than 50% stenosis bilaterally  Current rhythm:  Atrial flutter    Assessment/Plan:   1.  AMS, rule out seizure verus stroke:    · ASA  81 mg  · Will need ASA at discharge  · Neurochecks:  Every 4 hours  · Blood Sugar Goal:  140-180  · BP Goal: Less than 160  · cEEG overnight    Stroke risks:    · A1C:  6.2  · LDL:  104  · TTE:    · Follow up MRI:  No acute stroke  · Carotid vascular imaging: negative for stenosis    Risk factors for stroke include:  Obesity, HTN, CAD, HLD, Tobacco use, physical inactivity, CANDICE  · Discussed with patient    · Discussed signs/symptoms of stroke and when to call 911  · Smoking cessation education if appropriate    3. Mobility:   · Has been OOB. · PT/OT to eval for rehab    4.   Diet: · Did not receive STAND    5. VTE Prophylaxes:   · Per Primary team      Thank you for allowing the Neurology Service the pleasure of participating in the care of your patient. This patient will be discussed with my collaborating care team physician Dr. Jaycee Bradley and she may have further recommendations regarding this patient's care. Attending Attestation:   My exam was completed on 2/26/19  ==============================================================    Attending Addendum    I have reviewed the documentation provided by the nurse practitioner, Margaret Perry, discussed her findings, clinical impression, and the proposed management plans with regards to this patient's encounter. I have personally evaluated the patient, verify the history and confirm physical findings. Below are my additional findings:    HPI  This is a 57-year-old gentleman who presented to the emergency room with complaints of a syncopal episode. He was found to have a flutter and bradycardia when EMS found him. He was also using cannabis. Patient was admitted for further treatment. Neurology was consulted for possible seizure activity given that he did have bladder incontinence with the event. Patient had a CT of the head that was negative. He also had an EEG that was normal.  Additionally he had an MRI of the brain that was also negative. We came to see the patient he was transferred to the ICU for treatment of blood pressure. He was still in a flutter. Patient denies any further syncopal events.     CLINICAL DATA REVIEW  IMAGING: as above (I personally reviewed these images in PACS and this is my impression)      PHYSICAL EXAM (additional findings)  Patient Vitals for the past 8 hrs:   Temp Pulse Resp BP SpO2   02/26/19 1230  77 30 117/71 95 %   02/26/19 1215  99 30 136/81 100 %   02/26/19 1200 97.5 °F (36.4 °C) 83 19 133/87 98 %   02/26/19 1145  81 26 144/80 98 %   02/26/19 1130  98 26 (!) 138/107    02/26/19 1115  (!) 105 23 160/82 100 %   02/26/19 1100  73 18 137/72 100 %   02/26/19 1045  84 27 136/70 98 %   02/26/19 1030  81 24 121/74    02/26/19 1015  80 22 139/85 98 %   02/26/19 1000  67 26 120/77 95 %   02/26/19 0945  86 26 132/87 98 %   02/26/19 0930  78 26 116/89 93 %   02/26/19 0915  74 24 139/77 96 %   02/26/19 0900  93 (!) 31 144/84 93 %   02/26/19 0858    139/87    02/26/19 0845  (!) 105 29 139/87 90 %   02/26/19 0830  (!) 109 29 (!) 162/105 98 %   02/26/19 0810    156/76    02/26/19 0809    156/76    02/26/19 0800 97.7 °F (36.5 °C) 85 (!) 33 156/76 96 %   02/26/19 0730  96 26 146/87 97 %   02/26/19 0700  (!) 101 23 144/88 99 %   02/26/19 0630  88 28 (!) 152/92 94 %   02/26/19 0615  81 23 140/83 98 %   02/26/19 0600  75 27 139/73 94 %   02/26/19 0545  93 22 137/79 95 %   02/26/19 0530  95 23 (!) 162/96 92 %   02/26/19 0515  83 20 150/81 94 %   02/26/19 0500  79 25 143/78 95 %       General:  Alert, cooperative, no acute distress. Lungs:   Clear to auscultation bilaterally. No crackles/wheezes. Heart:  Abdominal:  Regular rate and rhythm, No murmur, click, rub or gallop. Soft and nondistended   Skin: Skin color, texture, turgor normal.    NEUROLOGICAL EXAM    Appearance:  Well developed, well nourished,  and is in no acute distress. Mental Status: Oriented to time, place and person. Fully attentive. No aphasia. Full fund of knowledge. Normal recent and remote memory. Cranial Nerves:   Intact visual fields. PERRL, EOM's full, no nystagmus, no ptosis. Facial sensation is normal. Facial movement is symmetric. Palate is midline. Normal sternocleidomastoid strength. Tongue is midline. Reflexes:   Deep tendon reflexes 2+/4 and symmetrical.   Sensory:   Normal to temperature and vibration. Gait:  Not tested   Tremor:   No tremor noted. Cerebellar:  No cerebellar signs present. Neurovascular:  Normal heart sounds and regular rhythm. No carotid bruits.      Motor: No pronator drift of either outstretched arm. Deltoid Biceps Triceps Wrist Extension Finger Abduction   L 5 5 5 5 5   R 5 5 5 5 5      Hip Flexion Hip Extension Knee Flexion Knee Extension Ankle Dorsiflexion Ankle Plantarflexion   L 5 5 5 5 5 5   R 5 5 5 5 5 5        Reflexes:     Biceps Triceps Plantar Patellar Achilles   L 2 2 2 2 2   R 2 2 2 2 2        ADDITIONAL ASSESSMENT AND RECOMMENDATIONS:  This is a 61-year-old gentleman who presented with an episode of syncope. Cardiology is following closely for arrhythmia and bradycardia. Neurological evaluation including MRI of the brain and EEG were both normal.  Patient does not have any epilepsy risk factors. No treatment for seizure indicated at this time. Patient is at his baseline today. 1.  Continues EEG was normal  2. MRI of the brain was negative  3. Continue treatment of a flutter per cardiology  4. Discussed with patient that irregular heartbeats can affect his risk for stroke and that he should take all medications prescribed  5. No AEDs indicated at this time    No further neurologic workup recommended. Will sign off for please call further questions. Anna Pena MD  2019       Review of Systems: 10 point ROS was performed. Pertinent positives listed in HPI. Negative ROS is as follows. Pt denies: angina, palpitations, paresthesias, weakness, vision loss, slurred speech, aphasia, fever, chills, falls, headache, diplopia, back pain, neck pain, prior episodes of vertigo, hallucinations, new medications or dosage changes. PHYSICAL EXAM     Visit Vitals  BP (!) 149/105   Pulse 76   Temp 97.9 °F (36.6 °C)   Resp 22   Ht 6' 2.5\" (1.892 m)   Wt 124.7 kg (275 lb)   SpO2 92%   BMI 34.84 kg/m²      O2 Device: Room air    Temp (24hrs), Av.9 °F (36.6 °C), Min:97.8 °F (36.6 °C), Max:98 °F (36.7 °C)    No intake/output data recorded.     1901 -  0700  In: 322.5 [I.V.:322.5]  Out: 600 [Urine:600]     History  Past Medical History:   Diagnosis Date    Hypertension     Palpitations      No past surgical history on file. Family History   Problem Relation Age of Onset    Hypertension Father     Hypertension Mother      Social History     Socioeconomic History    Marital status: SINGLE     Spouse name: Not on file    Number of children: Not on file    Years of education: Not on file    Highest education level: Not on file   Social Needs    Financial resource strain: Not on file    Food insecurity - worry: Not on file    Food insecurity - inability: Not on file    Transportation needs - medical: Not on file   [employer] needs - non-medical: Not on file   Occupational History    Not on file   Tobacco Use    Smoking status: Smoker, Current Status Unknown   Substance and Sexual Activity    Alcohol use: No     Frequency: Never    Drug use: Not on file    Sexual activity: Not on file   Other Topics Concern    Not on file   Social History Narrative    Not on file       Allergies   No Known Allergies    Outpatient Meds  No current facility-administered medications on file prior to encounter. No current outpatient medications on file prior to encounter.        Inpatient Meds    Current Facility-Administered Medications:     0.9% sodium chloride infusion, 75 mL/hr, IntraVENous, CONTINUOUS, Semaj Perales MD, Last Rate: 75 mL/hr at 02/25/19 0022, 75 mL/hr at 02/25/19 0022    sodium chloride (NS) flush 5-40 mL, 5-40 mL, IntraVENous, Q8H, Semaj Perales MD, 10 mL at 02/25/19 0021    sodium chloride (NS) flush 5-40 mL, 5-40 mL, IntraVENous, PRN, Semaj Perales MD    acetaminophen (TYLENOL) tablet 650 mg, 650 mg, Oral, Q4H PRNDo Khoi B, MD    HYDROcodone-acetaminophen (NORCO) 5-325 mg per tablet 1 Tab, 1 Tab, Oral, Q4H PRNDo Khoi B, MD    HYDROmorphone (PF) (DILAUDID) injection 0.5 mg, 0.5 mg, IntraVENous, Q4H PRNDo Khoi B, MD    naloxone (NARCAN) injection 0.4 mg, 0.4 mg, IntraVENous, PRN, Yue Perales MD [place] diphenhydrAMINE (BENADRYL) injection 12.5 mg, 12.5 mg, IntraVENous, Q4H PRN, Semaj Perales MD    ondansetron (ZOFRAN) injection 4 mg, 4 mg, IntraVENous, Q6H PRN, Semaj Perales MD    docusate sodium (COLACE) capsule 100 mg, 100 mg, Oral, BID, Semaj Perales MD, Stopped at 02/25/19 0900    [START ON 2/26/2019] cefTRIAXone (ROCEPHIN) 1 g in 0.9% sodium chloride (MBP/ADV) 50 mL, 1 g, IntraVENous, Q24H, Danay Armendariz MD    hydrALAZINE (APRESOLINE) 20 mg/mL injection 10 mg, 10 mg, IntraVENous, Q6H PRN, Danay Armendariz MD, 10 mg at 02/25/19 4424    hydrALAZINE (APRESOLINE) 20 mg/mL injection, , , ,     carvedilol (COREG) tablet 3.125 mg, 3.125 mg, Oral, BID WITH MEALS, Jesús Ahumada MD    sodium chloride (NS) flush 5-40 mL, 5-40 mL, IntraVENous, Q8H, Sabino Johnson DO    sodium chloride (NS) flush 5-40 mL, 5-40 mL, IntraVENous, PRN, Heydi DE LOS SANTOS DO    aspirin delayed-release tablet 81 mg, 81 mg, Oral, DAILY, DoNaa MD, 81 mg at 02/24/19 2222    amLODIPine (NORVASC) tablet 5 mg, 5 mg, Oral, DAILY, Naa Perales MD, 5 mg at 02/24/19 2248    labetalol (NORMODYNE;TRANDATE) 20 mg/4 mL (5 mg/mL) injection 20 mg, 20 mg, IntraVENous, Q4H PRN, Naa Perales MD, 20 mg at 02/24/19 2249  No current outpatient medications on file.     Recent Results (from the past 24 hour(s))   EKG, 12 LEAD, INITIAL    Collection Time: 02/24/19  8:15 PM   Result Value Ref Range    Ventricular Rate 74 BPM    Atrial Rate 234 BPM    QRS Duration 90 ms    Q-T Interval 386 ms    QTC Calculation (Bezet) 428 ms    Calculated P Axis -83 degrees    Calculated R Axis 40 degrees    Calculated T Axis -7 degrees    Diagnosis       Atrial flutter with variable AV block  Nonspecific T wave abnormality  Abnormal ECG  No previous ECGs available  Confirmed by Phoenix GODFREY, Floyd Yang ([id number]) on 2/25/2019 10:10:02 AM     SAMPLES BEING HELD    Collection Time: 02/24/19  8:19 PM   Result Value Ref Range    SAMPLES BEING HELD RED,BLUE,GOLD     COMMENT Add-on orders for these samples will be processed based on acceptable specimen integrity and analyte stability, which may vary by analyte. CBC WITH AUTOMATED DIFF    Collection Time: 02/24/19  8:19 PM   Result Value Ref Range    WBC 8.9 4.1 - 11.1 K/uL    RBC 4.50 4. 10 - 5.70 M/uL    HGB 12.5 12.1 - 17.0 g/dL    HCT 38.2 36.6 - 50.3 %    MCV 84.9 80.0 - 99.0 FL    MCH 27.8 26.0 - 34.0 PG    MCHC 32.7 30.0 - 36.5 g/dL    RDW 14.5 11.5 - 14.5 %    PLATELET 882 378 - 626 K/uL    MPV 11.7 8.9 - 12.9 FL    NRBC 0.0 0  WBC    ABSOLUTE NRBC 0.00 0.00 - 0.01 K/uL    NEUTROPHILS 56 32 - 75 %    LYMPHOCYTES 33 12 - 49 %    MONOCYTES 8 5 - 13 %    EOSINOPHILS 3 0 - 7 %    BASOPHILS 1 0 - 1 %    IMMATURE GRANULOCYTES 0 0.0 - 0.5 %    ABS. NEUTROPHILS 5.0 1.8 - 8.0 K/UL    ABS. LYMPHOCYTES 2.9 0.8 - 3.5 K/UL    ABS. MONOCYTES 0.7 0.0 - 1.0 K/UL    ABS. EOSINOPHILS 0.2 0.0 - 0.4 K/UL    ABS. BASOPHILS 0.1 0.0 - 0.1 K/UL    ABS. IMM. GRANS. 0.0 0.00 - 0.04 K/UL    DF AUTOMATED     METABOLIC PANEL, COMPREHENSIVE    Collection Time: 02/24/19  8:19 PM   Result Value Ref Range    Sodium 138 136 - 145 mmol/L    Potassium 4.3 3.5 - 5.1 mmol/L    Chloride 103 97 - 108 mmol/L    CO2 27 21 - 32 mmol/L    Anion gap 8 5 - 15 mmol/L    Glucose 113 (H) 65 - 100 mg/dL    BUN 16 6 - 20 MG/DL    Creatinine 1.68 (H) 0.70 - 1.30 MG/DL    BUN/Creatinine ratio 10 (L) 12 - 20      GFR est AA 52 (L) >60 ml/min/1.73m2    GFR est non-AA 43 (L) >60 ml/min/1.73m2    Calcium 8.6 8.5 - 10.1 MG/DL    Bilirubin, total 0.2 0.2 - 1.0 MG/DL    ALT (SGPT) 42 12 - 78 U/L    AST (SGOT) 21 15 - 37 U/L    Alk.  phosphatase 81 45 - 117 U/L    Protein, total 8.0 6.4 - 8.2 g/dL    Albumin 3.3 (L) 3.5 - 5.0 g/dL    Globulin 4.7 (H) 2.0 - 4.0 g/dL    A-G Ratio 0.7 (L) 1.1 - 2.2     MAGNESIUM    Collection Time: 02/24/19  8:19 PM   Result Value Ref Range    Magnesium 1.9 1.6 - 2.4 mg/dL   ETHYL ALCOHOL    Collection Time: 02/24/19  8:19 PM   Result Value Ref Range    ALCOHOL(ETHYL),SERUM <10 <10 MG/DL   TSH 3RD GENERATION    Collection Time: 02/24/19  8:19 PM   Result Value Ref Range    TSH 1.10 0.36 - 3.74 uIU/mL   POC TROPONIN-I    Collection Time: 02/24/19  8:23 PM   Result Value Ref Range    Troponin-I (POC) <0.04 0.00 - 0.08 ng/mL   URINALYSIS W/MICROSCOPIC    Collection Time: 02/24/19  9:55 PM   Result Value Ref Range    Color YELLOW/STRAW      Appearance CLEAR CLEAR      Specific gravity <1.005 1.003 - 1.030    pH (UA) 6.0 5.0 - 8.0      Protein NEGATIVE  NEG mg/dL    Glucose NEGATIVE  NEG mg/dL    Ketone NEGATIVE  NEG mg/dL    Bilirubin NEGATIVE  NEG      Blood NEGATIVE  NEG      Urobilinogen 1.0 0.2 - 1.0 EU/dL    Nitrites NEGATIVE  NEG      Leukocyte Esterase SMALL (A) NEG      WBC 10-20 0 - 4 /hpf    RBC 0-5 0 - 5 /hpf    Epithelial cells FEW FEW /lpf    Bacteria NEGATIVE  NEG /hpf    Hyaline cast 5-10 0 - 5 /lpf   URINE CULTURE HOLD SAMPLE    Collection Time: 02/24/19  9:55 PM   Result Value Ref Range    Urine culture hold        URINE ON HOLD IN MICROBIOLOGY DEPT FOR 3 DAYS. IF UNPRESERVED URINE IS SUBMITTED, IT CANNOT BE USED FOR ADDITIONAL TESTING AFTER 24 HRS, RECOLLECTION WILL BE REQUIRED.    DRUG SCREEN, URINE    Collection Time: 02/24/19  9:56 PM   Result Value Ref Range    AMPHETAMINES NEGATIVE  NEG      BARBITURATES NEGATIVE  NEG      BENZODIAZEPINES NEGATIVE  NEG      COCAINE NEGATIVE  NEG      METHADONE NEGATIVE  NEG      OPIATES NEGATIVE  NEG      PCP(PHENCYCLIDINE) NEGATIVE  NEG      THC (TH-CANNABINOL) POSITIVE (A) NEG      Drug screen comment (NOTE)    SODIUM, UR, RANDOM    Collection Time: 02/25/19 12:23 AM   Result Value Ref Range    Sodium,urine random 100 MMOL/L   CREATININE, UR, RANDOM    Collection Time: 02/25/19 12:23 AM   Result Value Ref Range    Creatinine, urine 110.30 mg/dL   SAMPLES BEING HELD    Collection Time: 02/25/19  1:03 AM   Result Value Ref Range    SAMPLES BEING HELD 1UA     COMMENT        Add-on orders for these samples will be processed based on acceptable specimen integrity and analyte stability, which may vary by analyte. METABOLIC PANEL, BASIC    Collection Time: 02/25/19  2:56 AM   Result Value Ref Range    Sodium 138 136 - 145 mmol/L    Potassium 3.8 3.5 - 5.1 mmol/L    Chloride 102 97 - 108 mmol/L    CO2 21 21 - 32 mmol/L    Anion gap 15 5 - 15 mmol/L    Glucose 127 (H) 65 - 100 mg/dL    BUN 15 6 - 20 MG/DL    Creatinine 1.41 (H) 0.70 - 1.30 MG/DL    BUN/Creatinine ratio 11 (L) 12 - 20      GFR est AA >60 >60 ml/min/1.73m2    GFR est non-AA 53 (L) >60 ml/min/1.73m2    Calcium 8.5 8.5 - 10.1 MG/DL   LIPID PANEL    Collection Time: 02/25/19  2:56 AM   Result Value Ref Range    LIPID PROFILE          Cholesterol, total 184 <200 MG/DL    Triglyceride 175 (H) <150 MG/DL    HDL Cholesterol 45 MG/DL    LDL, calculated 104 (H) 0 - 100 MG/DL    VLDL, calculated 35 MG/DL    CHOL/HDL Ratio 4.1 0 - 5.0     CBC W/O DIFF    Collection Time: 02/25/19  2:56 AM   Result Value Ref Range    WBC 14.5 (H) 4.1 - 11.1 K/uL    RBC 4.59 4. 10 - 5.70 M/uL    HGB 12.8 12.1 - 17.0 g/dL    HCT 39.0 36.6 - 50.3 %    MCV 85.0 80.0 - 99.0 FL    MCH 27.9 26.0 - 34.0 PG    MCHC 32.8 30.0 - 36.5 g/dL    RDW 14.6 (H) 11.5 - 14.5 %    PLATELET 974 083 - 955 K/uL    MPV 12.1 8.9 - 12.9 FL    NRBC 0.0 0  WBC    ABSOLUTE NRBC 0.00 0.00 - 0.01 K/uL   MAGNESIUM    Collection Time: 02/25/19  2:56 AM   Result Value Ref Range    Magnesium 2.3 1.6 - 2.4 mg/dL   PHOSPHORUS    Collection Time: 02/25/19  2:56 AM   Result Value Ref Range    Phosphorus 3.8 2.6 - 4.7 MG/DL   HEPATIC FUNCTION PANEL    Collection Time: 02/25/19  2:56 AM   Result Value Ref Range    Protein, total 8.2 6.4 - 8.2 g/dL    Albumin 3.4 (L) 3.5 - 5.0 g/dL    Globulin 4.8 (H) 2.0 - 4.0 g/dL    A-G Ratio 0.7 (L) 1.1 - 2.2      Bilirubin, total 0.2 0.2 - 1.0 MG/DL    Bilirubin, direct <0.1 0.0 - 0.2 MG/DL    Alk.  phosphatase 92 45 - 117 U/L    AST (SGOT) 24 15 - 37 U/L    ALT (SGPT) 43 12 - 78 U/L PTT    Collection Time: 02/25/19  2:56 AM   Result Value Ref Range    aPTT 32.5 (H) 22.1 - 32.0 sec    aPTT, therapeutic range     58.0 - 77.0 SECS   PROTHROMBIN TIME + INR    Collection Time: 02/25/19  2:56 AM   Result Value Ref Range    INR 1.1 0.9 - 1.1      Prothrombin time 11.2 (H) 9.0 - 11.1 sec   CK W/ CKMB & INDEX    Collection Time: 02/25/19  2:56 AM   Result Value Ref Range     39 - 308 U/L    CK - MB <1.0 <3.6 NG/ML    CK-MB Index Cannot be calculated 0 - 2.5     TROPONIN I    Collection Time: 02/25/19  2:56 AM   Result Value Ref Range    Troponin-I, Qt. <0.05 <0.05 ng/mL   DUPLEX CAROTID BILATERAL    Collection Time: 02/25/19  9:49 AM   Result Value Ref Range    Right subclavian sys 103.8 cm/s    RIGHT SUBCLAVIAN ARTERY D 0.00 cm/s    Right cca dist sys 68.5 cm/s    Right CCA dist dorman 18.0 cm/s    Right CCA prox sys 103.2 cm/s    Right CCA prox dorman 16.0 cm/s    Right eca sys 91.3 cm/s    RIGHT EXTERNAL CAROTID ARTERY D 16.30 cm/s    Right ICA dist sys 88.6 cm/s    Right ICA dist dorman 39.1 cm/s    Right ICA mid sys 57.8 cm/s    Right ICA mid dorman 22.6 cm/s    Right ICA prox sys 32.1 cm/s    Right ICA prox dorman 13.5 cm/s    Right vertebral sys 40.3 cm/s    RIGHT VERTEBRAL ARTERY D 9.68 cm/s    Right ICA/CCA sys 1.3     Left subclavian sys 133.3 cm/s    LEFT SUBCLAVIAN ARTERY D 0.00 cm/s    Left CCA dist sys 86.2 cm/s    Left CCA dist dorman 24.1 cm/s    Left CCA prox sys 149.3 cm/s    Left CCA prox dorman 24.0 cm/s    Left ECA sys 109.2 cm/s    LEFT EXTERNAL CAROTID ARTERY D 34.89 cm/s    Left ICA dist sys 55.4 cm/s    Left ICA dist dorman 15.0 cm/s    Left ICA mid sys 63.2 cm/s    Left ICA mid dorman 16.9 cm/s    Left ICA prox sys 35.3 cm/s    Left ICA prox dorman 11.8 cm/s    Left vertebral sys 52.0 cm/s    LEFT VERTEBRAL ARTERY D 15.35 cm/s    Left ICA/CCA sys 0.73        Care Plan discussed with:  Patient x   Family    RN    Care Manager    Consultant/Specialist:       Princess Casarez, DILLONP-BC

## 2019-02-25 NOTE — ED PROVIDER NOTES
48 y.o. male with no significant past medical history, presents from home via EMS with chief complaint of syncope. Patient states that he was watching TV prior to arrival when he suddenly became unconscious. Patient's syncopal episode was witnessed by family, who in-turn called 9-1-1. Per EMS, patient's HR was in the 35s when they arrived on the scene. He was also incontinent of urine with a GCS of 10, and EMS states that patient's residence had a strong smell of cannabis. Patient is now A&O, able to hold a conversation, and denies any episodes of bowel or bladder incontinence or history of similar symptoms. Denies known history of CAD, and he specifically denies chest pain, chest tightness, palpitations, or shortness of breath associated with the syncopal episode tonight. There are no other acute medical concerns at this time. Social hx: Positive Tobacco use (current cigarette smoker); Positive EtOH use (occasional) PCP: John, MD Lily   
 
Note written by Burt Aguirre, as dictated by Drake Hunter, DO 8:22 PM 
 
 
 
The history is provided by the patient and the EMS personnel. No  was used. Past Medical History:  
Diagnosis Date  Hypertension  Palpitations No past surgical history on file. Family History Problem Relation Age of Onset  Hypertension Father  Hypertension Mother Social History Socioeconomic History  Marital status: SINGLE Spouse name: Not on file  Number of children: Not on file  Years of education: Not on file  Highest education level: Not on file Social Needs  Financial resource strain: Not on file  Food insecurity - worry: Not on file  Food insecurity - inability: Not on file  Transportation needs - medical: Not on file  Transportation needs - non-medical: Not on file Occupational History  Not on file Tobacco Use  Smoking status: Smoker, Current Status Unknown Substance and Sexual Activity  Alcohol use: No  
  Frequency: Never  Drug use: Not on file  Sexual activity: Not on file Other Topics Concern  Not on file Social History Narrative  Not on file No Known Allergies Review of Systems Constitutional: Negative for appetite change, chills, fever and unexpected weight change. HENT: Negative for ear pain, hearing loss, rhinorrhea and trouble swallowing. Eyes: Negative for pain and visual disturbance. Respiratory: Negative for cough, chest tightness and shortness of breath. Cardiovascular: Negative for chest pain and palpitations. Gastrointestinal: Negative for abdominal distention, abdominal pain, blood in stool and vomiting. Genitourinary: Negative for dysuria, hematuria and urgency. Musculoskeletal: Negative for back pain and myalgias. Skin: Negative for rash. Neurological: Positive for syncope. Negative for dizziness, weakness and numbness. Psychiatric/Behavioral: Negative for confusion and suicidal ideas. All other systems reviewed and are negative. Vitals:  
 02/24/19 2145 02/24/19 2200 02/24/19 2215 02/24/19 2248 BP: (!) 155/96 (!) 168/105 (!) 166/113 (!) 182/108 Pulse: 68 76 74 73 Resp: 13 21 (!) 31 SpO2: 91% 98% 95% Weight:      
Height:      
 
Physical Exam  
Constitutional: He is oriented to person, place, and time. He appears well-developed and well-nourished. No distress. HENT:  
Head: Normocephalic and atraumatic. Right Ear: External ear normal.  
Left Ear: External ear normal.  
Nose: Nose normal.  
Mouth/Throat: Oropharynx is clear and moist. No oropharyngeal exudate. Eyes: Conjunctivae and EOM are normal. Pupils are equal, round, and reactive to light. Right eye exhibits no discharge. Left eye exhibits no discharge. No scleral icterus. Neck: Normal range of motion. Neck supple. No JVD present. No tracheal deviation present. Cardiovascular: Normal rate, normal heart sounds and intact distal pulses. An irregularly irregular rhythm present. Exam reveals no gallop and no friction rub. No murmur heard. Pulmonary/Chest: Effort normal and breath sounds normal. No stridor. No respiratory distress. He has no decreased breath sounds. He has no wheezes. He has no rhonchi. He has no rales. He exhibits no tenderness. Abdominal: Soft. Bowel sounds are normal. He exhibits no distension. There is no tenderness. There is no rebound and no guarding. Musculoskeletal: Normal range of motion. He exhibits no edema or tenderness. Neurological: He is alert and oriented to person, place, and time. He has normal strength and normal reflexes. He displays normal reflexes. No cranial nerve deficit or sensory deficit. He exhibits normal muscle tone. Coordination normal. GCS eye subscore is 4. GCS verbal subscore is 5. GCS motor subscore is 6. Skin: Skin is warm and dry. No rash noted. He is not diaphoretic. No erythema. No pallor. Psychiatric: He has a normal mood and affect. His behavior is normal. Judgment and thought content normal.  
Nursing note and vitals reviewed. Note written by Flaco Montgomery. Lynda Morgan, as dictated by Alexsandra Tavarez, DO 8:22 PM 
 
MDM Number of Diagnoses or Management Options Acute renal failure, unspecified acute renal failure type [facility]): Atrial flutter, unspecified type [facility]):  
Chest pain, unspecified type:  
Syncope and collapse:  
  
Amount and/or Complexity of Data Reviewed Clinical lab tests: ordered and reviewed Tests in the radiology section of CPT®: ordered and reviewed Tests in the medicine section of CPT®: ordered and reviewed Discuss the patient with other providers: yes (Hospitalist) Independent visualization of images, tracings, or specimens: yes (ekg) Risk of Complications, Morbidity, and/or Mortality Presenting problems: moderate Diagnostic procedures: moderate Management options: moderate Critical Care Total time providing critical care: 30-74 minutes (Total critical care time spent exclusive of procedures:  35 minutes) Patient Progress Patient progress: stable Procedures Chief Complaint Patient presents with  Chest Pain The patient's presenting problems have been discussed, and they are in agreement with the care plan formulated and outlined with them. I have encouraged them to ask questions as they arise throughout their visit. MEDICATIONS GIVEN: 
Medications  
sodium chloride (NS) flush 5-40 mL (not administered)  
sodium chloride (NS) flush 5-40 mL (not administered) aspirin delayed-release tablet 81 mg (81 mg Oral Given 2/24/19 2222) amLODIPine (NORVASC) tablet 5 mg (5 mg Oral Given 2/24/19 2248)  
labetalol (NORMODYNE;TRANDATE) 20 mg/4 mL (5 mg/mL) injection 20 mg (20 mg IntraVENous Given 2/24/19 2249)  
sodium chloride 0.9 % bolus infusion 1,000 mL (1,000 mL IntraVENous New Bag 2/24/19 2029)  
magnesium sulfate 2 g/50 ml IVPB (premix or compounded) (2 g IntraVENous New Bag 2/24/19 2058) iopamidol (ISOVUE-370) 76 % injection 100 mL (100 mL IntraVENous Given 2/24/19 2109)  
enoxaparin (LOVENOX) injection 120 mg (120 mg SubCUTAneous Given 2/24/19 2249) LABS REVIEWED: 
Recent Results (from the past 24 hour(s)) SAMPLES BEING HELD Collection Time: 02/24/19  8:19 PM  
Result Value Ref Range SAMPLES BEING HELD RED,BLUE,GOLD COMMENT Add-on orders for these samples will be processed based on acceptable specimen integrity and analyte stability, which may vary by analyte. CBC WITH AUTOMATED DIFF Collection Time: 02/24/19  8:19 PM  
Result Value Ref Range WBC 8.9 4.1 - 11.1 K/uL  
 RBC 4.50 4. 10 - 5.70 M/uL  
 HGB 12.5 12.1 - 17.0 g/dL HCT 38.2 36.6 - 50.3 % MCV 84.9 80.0 - 99.0 FL  
 MCH 27.8 26.0 - 34.0 PG  
 MCHC 32.7 30.0 - 36.5 g/dL  
 RDW 14.5 11.5 - 14.5 % PLATELET 354 330 - 213 K/uL MPV 11.7 8.9 - 12.9 FL  
 NRBC 0.0 0  WBC ABSOLUTE NRBC 0.00 0.00 - 0.01 K/uL NEUTROPHILS 56 32 - 75 % LYMPHOCYTES 33 12 - 49 % MONOCYTES 8 5 - 13 % EOSINOPHILS 3 0 - 7 % BASOPHILS 1 0 - 1 % IMMATURE GRANULOCYTES 0 0.0 - 0.5 % ABS. NEUTROPHILS 5.0 1.8 - 8.0 K/UL  
 ABS. LYMPHOCYTES 2.9 0.8 - 3.5 K/UL  
 ABS. MONOCYTES 0.7 0.0 - 1.0 K/UL  
 ABS. EOSINOPHILS 0.2 0.0 - 0.4 K/UL  
 ABS. BASOPHILS 0.1 0.0 - 0.1 K/UL  
 ABS. IMM. GRANS. 0.0 0.00 - 0.04 K/UL  
 DF AUTOMATED METABOLIC PANEL, COMPREHENSIVE Collection Time: 02/24/19  8:19 PM  
Result Value Ref Range Sodium 138 136 - 145 mmol/L Potassium 4.3 3.5 - 5.1 mmol/L Chloride 103 97 - 108 mmol/L  
 CO2 27 21 - 32 mmol/L Anion gap 8 5 - 15 mmol/L Glucose 113 (H) 65 - 100 mg/dL BUN 16 6 - 20 MG/DL Creatinine 1.68 (H) 0.70 - 1.30 MG/DL  
 BUN/Creatinine ratio 10 (L) 12 - 20 GFR est AA 52 (L) >60 ml/min/1.73m2 GFR est non-AA 43 (L) >60 ml/min/1.73m2 Calcium 8.6 8.5 - 10.1 MG/DL Bilirubin, total 0.2 0.2 - 1.0 MG/DL  
 ALT (SGPT) 42 12 - 78 U/L  
 AST (SGOT) 21 15 - 37 U/L Alk. phosphatase 81 45 - 117 U/L Protein, total 8.0 6.4 - 8.2 g/dL Albumin 3.3 (L) 3.5 - 5.0 g/dL Globulin 4.7 (H) 2.0 - 4.0 g/dL A-G Ratio 0.7 (L) 1.1 - 2.2 MAGNESIUM Collection Time: 02/24/19  8:19 PM  
Result Value Ref Range Magnesium 1.9 1.6 - 2.4 mg/dL ETHYL ALCOHOL Collection Time: 02/24/19  8:19 PM  
Result Value Ref Range ALCOHOL(ETHYL),SERUM <10 <10 MG/DL  
TSH 3RD GENERATION Collection Time: 02/24/19  8:19 PM  
Result Value Ref Range TSH 1.10 0.36 - 3.74 uIU/mL  
POC TROPONIN-I Collection Time: 02/24/19  8:23 PM  
Result Value Ref Range Troponin-I (POC) <0.04 0.00 - 0.08 ng/mL URINALYSIS W/MICROSCOPIC Collection Time: 02/24/19  9:55 PM  
Result Value Ref Range Color YELLOW/STRAW Appearance CLEAR CLEAR  Specific gravity <1.005 1.003 - 1.030  
 pH (UA) 6.0 5.0 - 8.0 Protein NEGATIVE  NEG mg/dL Glucose NEGATIVE  NEG mg/dL Ketone NEGATIVE  NEG mg/dL Bilirubin NEGATIVE  NEG Blood NEGATIVE  NEG Urobilinogen 1.0 0.2 - 1.0 EU/dL Nitrites NEGATIVE  NEG Leukocyte Esterase SMALL (A) NEG    
 WBC 10-20 0 - 4 /hpf  
 RBC 0-5 0 - 5 /hpf Epithelial cells FEW FEW /lpf Bacteria NEGATIVE  NEG /hpf Hyaline cast 5-10 0 - 5 /lpf URINE CULTURE HOLD SAMPLE Collection Time: 02/24/19  9:55 PM  
Result Value Ref Range Urine culture hold URINE ON HOLD IN MICROBIOLOGY DEPT FOR 3 DAYS. IF UNPRESERVED URINE IS SUBMITTED, IT CANNOT BE USED FOR ADDITIONAL TESTING AFTER 24 HRS, RECOLLECTION WILL BE REQUIRED. DRUG SCREEN, URINE Collection Time: 02/24/19  9:56 PM  
Result Value Ref Range AMPHETAMINES NEGATIVE  NEG    
 BARBITURATES NEGATIVE  NEG BENZODIAZEPINES NEGATIVE  NEG    
 COCAINE NEGATIVE  NEG METHADONE NEGATIVE  NEG    
 OPIATES NEGATIVE  NEG    
 PCP(PHENCYCLIDINE) NEGATIVE  NEG    
 THC (TH-CANNABINOL) POSITIVE (A) NEG Drug screen comment (NOTE) VITAL SIGNS: 
Patient Vitals for the past 12 hrs: 
 Pulse Resp BP SpO2  
02/24/19 2248 73  (!) 182/108   
02/24/19 2215 74 (!) 31 (!) 166/113 95 % 02/24/19 2200 76 21 (!) 168/105 98 % 02/24/19 2145 68 13 (!) 155/96 91 % 02/24/19 2130 71 17 (!) 160/99 (!) 88 % 02/24/19 2058 97  135/89   
02/24/19 2045 70 26 135/89 96 % 02/24/19 2030 96 16 138/71 94 % 02/24/19 2014 93 23 134/80 93 % RADIOLOGY RESULTS: 
The following have been ordered and reviewed: 
Ct Head Wo Cont Result Date: 2/24/2019 EXAM: CT HEAD WO CONT INDICATION: syncope COMPARISON: None. CONTRAST: None. TECHNIQUE: Unenhanced CT of the head was performed using 5 mm images. Brain and bone windows were generated. CT dose reduction was achieved through use of a standardized protocol tailored for this examination and automatic exposure control for dose modulation.   FINDINGS: The ventricles and sulci are normal in size, shape and configuration and midline. There is a periventricular white matter hypodensity. Additional foci of discrete hypodensity are present in the left caudate nucleus, January of both internal capsules, and right thalamus. . There is no intracranial hemorrhage, extra-axial collection, mass, mass effect or midline shift. The basilar cisterns are open. No acute infarct is identified. The bone windows demonstrate no abnormalities. The visualized portions of the paranasal sinuses and mastoid air cells are clear. IMPRESSION: White matter disease. Old lacunar infarcts. No acute intracranial hemorrhage identified. Cta Chest W Or W Wo Cont Result Date: 2/24/2019 EXAM: CTA CHEST W OR W WO CONT INDICATION: syncope COMPARISON: None. CONTRAST: 100 mL of Isovue-370. TECHNIQUE: Precontrast  images were obtained to localize the volume for acquisition. Multislice helical CT arteriography was performed from the diaphragm to the thoracic inlet during uneventful rapid bolus intravenous contrast administration. Lung and soft tissue windows were generated. Coronal and sagittal images were generated and 3D post processing consisting of coronal maximum intensity images was performed. CT dose reduction was achieved through use of a standardized protocol tailored for this examination and automatic exposure control for dose modulation. FINDINGS: The lungs are clear of mass, nodule, airspace disease or edema. Emphysematous changes most pronounced at the lung apices. The pulmonary arteries are well enhanced and no pulmonary emboli are identified. There is no mediastinal or hilar adenopathy or mass. The aorta enhances normally without evidence of aneurysm or dissection. The visualized portions of the upper abdominal organs are normal.  
 
IMPRESSION: No evidence for pulmonary embolism. Emphysema. ED EKG interpretation: Rhythm: atrial flutter with variable AV block; and irregular. Rate (approx.): 74; Axis: normal; QRS interval: normal ; ST/T wave: non-specific changes; Other findings: abnormal ekg. This EKG was interpreted by Arturo Adkins DO, ED Provider. CONSULTATIONS:  
10:06 PM Hermilo Iglesias DO spoke with Dr. Oralia Rangel, Consult for Hospitalist.  Discussed available diagnostic tests and clinical findings.  will evaluate the patient for admission at the hospital. 
 
PROGRESS NOTES: 
8:56 PM 
Pt was recently incarcerated at [facility] then transferred to [facility]. He was released on 02/07/2019, and upon release was found to have high blood pressure. He is not currently on any antihypertensive agents. 10:04 PM  
Discussed results and plan with patient. Patient will be admitted/observed for further evaluation and treatment. DIAGNOSIS: 
 
1. Atrial flutter, unspecified type ([address] 2. Chest pain, unspecified type 3. Syncope and collapse 4. Acute renal failure, unspecified acute renal failure type ([address] PLAN: 
Admit to Hospitalist 
 
10:07 PM 
Patient is being admitted to the hospital.  The results of their tests and reasons for their admission have been discussed with them and/or available family. They convey agreement and understanding for the need to be admitted and for their admission diagnosis. ED COURSE: The patient's hospital course has been uncomplicated.

## 2019-02-25 NOTE — PROGRESS NOTES
TRANSFER - OUT REPORT: 
 
Verbal report given to Zuleima Larson on Shira Clemens  being transferred to ICU for change in patient condition(uncontrolled hypertenion) Report consisted of patients Situation, Background, Assessment and  
Recommendations(SBAR). Information from the following report(s) SBAR, Kardex, Procedure Summary, MAR and Recent Results was reviewed with the receiving nurse. Lines:  
Peripheral IV 02/24/19 Left Antecubital (Active) Site Assessment Clean, dry, & intact 2/25/2019  4:00 AM  
Phlebitis Assessment 0 2/25/2019  4:00 AM  
Infiltration Assessment 0 2/25/2019  4:00 AM  
Dressing Status Clean, dry, & intact 2/25/2019  4:00 AM  
Dressing Type Tape;Transparent 2/25/2019  4:00 AM  
Hub Color/Line Status Green;Flushed; Infusing 2/25/2019  4:00 AM  
Action Taken Dressing reinforced 2/24/2019  8:13 PM  
  
 
Opportunity for questions and clarification was provided. Patient transported with: 
 Registered Nurse

## 2019-02-25 NOTE — ED TRIAGE NOTES
Per EMS patient had AMS upon their arrival. Blood sugar was 138. 18g Left AC IV placed by EMS. EMS reports strong smell of cannabis on arrival, with hx of cannibas use. Patient has no recollection of chest pain; patient's mother called EMS reporting chest pain while patient was unconscious.

## 2019-02-25 NOTE — PROGRESS NOTES
Jamal Welchelsen sheela [place] [address] 
[address] [facility], [address] [place] [address] 
[phone] Medical Progress Note NAME: Dilia Robison :  1965 MRM:  [id number] Date/Time: 2019 Assessment / Plan:  
 
  Uncontrolled hypertension: worse today. Treating with nitropaste, IV labetalol, IV hydralazine. Adding Norvasc. Continue Coreg. If remains uncontrolled then start IV nicardipine. TTE New onset atrial flutter: rate controlled. Given a dose of Lovenox. Further anticoagulation per cardiology. Continue Coreg. Syncope and collapse: unclear etiology. ?tachyarrhthmia. MRI brain showed old lacunar infarct. Monitor on tele. BP control. TTE. Appreciate neuro evaluation, planning EEG RAJ (acute kidney injury): improving. Monitor BMP. Renal US unremarkable. Obesity: likely has CANDICE. Needs outpatient polysomnography. Total time spent:  35 minutes Time spent in the care of this patient including reviewing records, discussing with nursing and/or other providers on the treatment team, obtaining history and examining the patient, and discussing treatment plans. Care Plan discussed with: Patient, Nursing Staff and >50% of time spent in counseling and coordination of care Discussed:  Care Plan and D/C Planning Prophylaxis:  Lovenox Disposition:  Home w/Family Subjective: Chief Complaint:  Follow up syncope Chart/notes/labs/studies reviewed, patient examined at bedside. Feels okay, no CP or SOB. No HA. Objective:  
 
 
Vitals:  
 
  
Last 24hrs VS reviewed since prior progress note. Most recent are: 
 
Visit Vitals BP (!) (P) 206/101 Pulse 75 Temp 97.9 °F (36.6 °C) Resp 22 Ht 6' 2.5\" (1.892 m) Wt 124.7 kg (275 lb) SpO2 92% BMI 34.84 kg/m² SpO2 Readings from Last 6 Encounters:  
19 92% Intake/Output Summary (Last 24 hours) at 2019 8767 Last data filed at 2019 0400 Gross per 24 hour Intake 322.5 ml Output 600 ml Net -277.5 ml Exam:  
 
Physical Exam: 
 
Gen: obese. Chronically ill-appearing. NAD HEENT:  Sclerae nonicteric, hearing intact to voice, mucous membranes moist 
Neck:  Supple, without masses. Resp:  No accessory muscle use, CTAB without wheezes, rales, or rhonchi 
Card: RRR, without m/r/g. No LE edema. Abd:  +bowel sounds, soft, NTTP, nondistended. No HSM. Neuro: Face symmetric, tongue midline, speech fluent, follows commands appropriately Psych:  Alert, oriented x 3. Fair insight Medications Reviewed: (see below) Lab Data Reviewed: (see below) 
 
______________________________________________________________________ Medications:  
 
Current Facility-Administered Medications Medication Dose Route Frequency  0.9% sodium chloride infusion  75 mL/hr IntraVENous CONTINUOUS  
 sodium chloride (NS) flush 5-40 mL  5-40 mL IntraVENous Q8H  
 sodium chloride (NS) flush 5-40 mL  5-40 mL IntraVENous PRN  
 acetaminophen (TYLENOL) tablet 650 mg  650 mg Oral Q4H PRN  
 HYDROcodone-acetaminophen (NORCO) 5-325 mg per tablet 1 Tab  1 Tab Oral Q4H PRN  
 HYDROmorphone (PF) (DILAUDID) injection 0.5 mg  0.5 mg IntraVENous Q4H PRN  
 naloxone (NARCAN) injection 0.4 mg  0.4 mg IntraVENous PRN  
 diphenhydrAMINE (BENADRYL) injection 12.5 mg  12.5 mg IntraVENous Q4H PRN  
 ondansetron (ZOFRAN) injection 4 mg  4 mg IntraVENous Q6H PRN  
 docusate sodium (COLACE) capsule 100 mg  100 mg Oral BID  [START ON 2/26/2019] cefTRIAXone (ROCEPHIN) 1 g in 0.9% sodium chloride (MBP/ADV) 50 mL  1 g IntraVENous Q24H  hydrALAZINE (APRESOLINE) 20 mg/mL injection 10 mg  10 mg IntraVENous Q6H PRN  
 hydrALAZINE (APRESOLINE) 20 mg/mL injection  carvedilol (COREG) tablet 3.125 mg  3.125 mg Oral BID WITH MEALS  nitroglycerin (NITROBID) 2 % ointment 1 Inch  1 Inch Topical BID  amLODIPine (NORVASC) tablet 10 mg  10 mg Oral DAILY  niCARdipine (CARDENE) 25 mg in 0.9% sodium chloride 250 mL infusion  0-15 mg/hr IntraVENous TITRATE  sodium chloride (NS) flush 5-40 mL  5-40 mL IntraVENous Q8H  
 sodium chloride (NS) flush 5-40 mL  5-40 mL IntraVENous PRN  
 aspirin delayed-release tablet 81 mg  81 mg Oral DAILY  labetalol (NORMODYNE;TRANDATE) injection 20 mg  20 mg IntraVENous Q4H PRN Lab Review:  
 
Recent Labs  
  02/25/19 0256 02/24/19 2019 WBC 14.5* 8.9 HGB 12.8 12.5 HCT 39.0 38.2  199 Recent Labs  
  02/25/19 0256 02/24/19 2019  138  
K 3.8 4.3  103 CO2 21 27 * 113* BUN 15 16 CREA 1.41* 1.68* CA 8.5 8.6 MG 2.3 1.9 PHOS 3.8  --   
ALB 3.4* 3.3* SGOT 24 21 ALT 43 42 INR 1.1  -- No components found for: [place] No results for input(s): PH, PCO2, PO2, HCO3, FIO2 in the last 72 hours. Recent Labs  
  02/25/19 0256 INR 1.1 No results found for: SDES No results found for: CULT 
        
___________________________________________________ Attending Physician: Penelope Manning MD

## 2019-02-25 NOTE — PROGRESS NOTES
physical Therapy EVALUATION/DISCHARGE Patient: Toby Mathews (3 y.o. male) Date: 2/25/2019 Primary Diagnosis: Syncope and collapse [R55] New onset atrial flutter ([facility] [address] [I48.92] Precautions: None ASSESSMENT : 
Based on the objective data described below, the patient presents at or near his functional baseline. He reports that he was independent and active prior to onset of symptoms that lead to admission. Lives alone in single level home with no steps to enter, denied falls and owns no DME. Pt currently performing all aspects of mobility at independent level and has no concerns regarding mobility at this time. He is safe to dc home from a mobility standpoint once medically stable. Mercedes Art Further skilled acute physical therapy is not indicated at this time. PLAN : 
Discharge Recommendations: None Further Equipment Recommendations for Discharge: None SUBJECTIVE:  
Patient stated I don't need any of this. I'm a normal 48year old.  OBJECTIVE DATA SUMMARY:  
HISTORY:   
Past Medical History:  
Diagnosis Date  Hypertension  Palpitations No past surgical history on file. Prior Level of Function/Home Situation: See above Personal factors and/or comorbidities impacting plan of care:  
 
Home Situation Home Environment: Private residence # Steps to Enter: 0 One/Two Story Residence: One story Living Alone: Yes Support Systems: Family member(s) Patient Expects to be Discharged to[de-identified] Private residence Current DME Used/Available at Home: None Tub or Shower Type: Shower EXAMINATION/PRESENTATION/DECISION MAKING:  
Critical Behavior: 
Neurologic State: Alert Orientation Level: Oriented X4 Cognition: Follows commands Safety/Judgement: Awareness of environment, Fall prevention Hearing: Auditory Auditory Impairment: None Skin:  Defer to RN notes Edema: None Range Of Motion: 
AROM: Within functional limits PROM: Within functional limits Strength:   
 Strength: Within functional limits Tone & Sensation:  
Tone: Normal 
  
  
  
  
Sensation: Intact Coordination: 
Coordination: Within functional limits Vision:  
Acuity: Within Defined Limits Functional Mobility: 
Bed Mobility: 
  
Supine to Sit: Independent Transfers: 
Sit to Stand: Modified independent Stand to Sit: Modified independent Balance:  
Sitting: Intact Standing: Intact Ambulation/Gait Training: 
  
  
  
  
Gait Description (WDL): Within defined limits Functional Measure: 
Barthel Index: 
 
Bathin Bladder: 10 Bowels: 10 
Groomin Dressing: 10 Feeding: 10 Mobility: 15 
Stairs: 10 Toilet Use: 10 Transfer (Bed to Chair and Back): 15 Total: 100 The Barthel ADL Index: Guidelines 1. The index should be used as a record of what a patient does, not as a record of what a patient could do. 2. The main aim is to establish degree of independence from any help, physical or verbal, however minor and for whatever reason. 3. The need for supervision renders the patient not independent. 4. A patient's performance should be established using the best available evidence. Asking the patient, friends/relatives and nurses are the usual sources, but direct observation and common sense are also important. However direct testing is not needed. 5. Usually the patient's performance over the preceding 24-48 hours is important, but occasionally longer periods will be relevant. 6. Middle categories imply that the patient supplies over 50 per cent of the effort. 7. Use of aids to be independent is allowed. Robert Cartagena., Barthel, D.W. (1385). Functional evaluation: the Barthel Index. [address] [facility] (14)2. Gaile Rubinstein geetha ZAN Ackerman, Celia Roldan., Shanell Oshea., Fabiola Denise, [address] [place] [address] ().  Measuring the change indisability after inpatient rehabilitation; comparison of the responsiveness of the Barthel Index and Functional [place] Measure. Journal of Neurology, Neurosurgery, and Psychiatry, 66(4), 641-409. EMERALD Bertrand, ROBERTO Allen, & Hitesh Dykes M.A. (2004.) Assessment of post-stroke quality of life in cost-effectiveness studies: The usefulness of the Barthel Index and the EuroQoL-5D. [facility], 13, 112-64 Physical Therapy Evaluation Charge Determination History Examination Presentation Decision-Making LOW Complexity : Zero comorbidities / personal factors that will impact the outcome / POC LOW Complexity : 1-2 Standardized tests and measures addressing body structure, function, activity limitation and / or participation in recreation  LOW Complexity : Stable, uncomplicated  LOW Complexity : FOTO score of  Based on the above components, the patient evaluation is determined to be of the following complexity level: LOW Pain: 
Pain Scale 1: Numeric (0 - 10) Pain Intensity 1: 0 Activity Tolerance: Tolerated session well. Vitals stable, denied pain. Please refer to the flowsheet for vital signs taken during this treatment. After treatment:  
[x]   Patient left in no apparent in wheelchair outside of MRI 
[]   Patient left in no apparent distress in bed 
[]   Call bell left within reach 
[]   Nursing notified 
[]   Caregiver present 
[]   Bed alarm activated COMMUNICATION/EDUCATION:  
Communication/Collaboration: 
[x]   Fall prevention education was provided and the patient/caregiver indicated understanding. [x]   Patient/family have participated as able and agree with findings and recommendations. []   Patient is unable to participate in plan of care at this time. Findings and recommendations were discussed with: Occupational Therapist and Registered Nurse Thank you for this referral. 
Gilberto Case PT, DPT Time Calculation: 8 mins

## 2019-02-25 NOTE — CONSULTS
Rebeca Kumar MD    [address] [place], [address] [facility]    Office [phone],WWR [phone]  Pager [phone]    Date of  Admission: 2/24/2019  8:09 PM  PCP- Other, MD Lily    Kelli Rodriges is a 48 y.o. male admitted for Syncope and collapse [R55]  New onset atrial flutter ([address] [I48.92]. Consult requested by Jose Santamaria MD    Assessment/Plan    Aflutter withCVR  Syncope  HTN - not controlled  Renal Insuff  Leucocytosis    Plan:  On Norvasc ( started this admn)  Add B Blocker - Coreg 3.125mg bid  ECHO  Start Lovenox 1mg/bid ; Eliquis . CT Head - lacunar infarcts  Will need ischemia w/u- if EF nml on echo and BP controlled - stress nuc study this admn           I appreciate the opportunity to be involved in . See below note for details. Please do not hesitate to contact us with questions or concerns. Rebeca Kumar MD    Cardiac Testing/ Procedures: A. Cardiac Cath/PCI:    B.ECHO/UMBERTO:    C.StressNuclear/Stress ECHO/Stress test:    D.Vascular: 2/25/19  Carotid doppler - 0-49% bilat    E. EP:    F. Miscellaneous:    Care Plan discussed with:     Subjective:    49 yo hx of HTN, admitted for aflutter / syncope. The patient was apparently sitting at home  when he \"passed out\" for several minutes. Pt does not remember the episode. His mother found him and called EMS. No CP/dyspnea/swelling LE. In ED - EKG - aflutter with CVR. Trop  - WNL; WBC 14.5, Cr 1.4  No prior hx of CAD/arrhythmia              Past Medical History:   Diagnosis Date    Hypertension     Palpitations       No past surgical history on file.   No Known Allergies  Family History   Problem Relation Age of Onset    Hypertension Father     Hypertension Mother       Social History     Tobacco Use    Smoking status: Smoker, Current Status Unknown   Substance Use Topics    Alcohol use: No     Frequency: Never    Drug use: Not on file          Medications:    (Not in a hospital admission)  Current Facility-Administered Medications   Medication Dose Route Frequency    0.9% sodium chloride infusion  75 mL/hr IntraVENous CONTINUOUS    sodium chloride (NS) flush 5-40 mL  5-40 mL IntraVENous Q8H    sodium chloride (NS) flush 5-40 mL  5-40 mL IntraVENous PRN    acetaminophen (TYLENOL) tablet 650 mg  650 mg Oral Q4H PRN    HYDROcodone-acetaminophen (NORCO) 5-325 mg per tablet 1 Tab  1 Tab Oral Q4H PRN    HYDROmorphone (PF) (DILAUDID) injection 0.5 mg  0.5 mg IntraVENous Q4H PRN    naloxone (NARCAN) injection 0.4 mg  0.4 mg IntraVENous PRN    diphenhydrAMINE (BENADRYL) injection 12.5 mg  12.5 mg IntraVENous Q4H PRN    ondansetron (ZOFRAN) injection 4 mg  4 mg IntraVENous Q6H PRN    docusate sodium (COLACE) capsule 100 mg  100 mg Oral BID    [START ON 2/26/2019] cefTRIAXone (ROCEPHIN) 1 g in 0.9% sodium chloride (MBP/ADV) 50 mL  1 g IntraVENous Q24H    hydrALAZINE (APRESOLINE) 20 mg/mL injection 10 mg  10 mg IntraVENous Q6H PRN    hydrALAZINE (APRESOLINE) 20 mg/mL injection        sodium chloride (NS) flush 5-40 mL  5-40 mL IntraVENous Q8H    sodium chloride (NS) flush 5-40 mL  5-40 mL IntraVENous PRN    aspirin delayed-release tablet 81 mg  81 mg Oral DAILY    amLODIPine (NORVASC) tablet 5 mg  5 mg Oral DAILY    labetalol (NORMODYNE;TRANDATE) 20 mg/4 mL (5 mg/mL) injection 20 mg  20 mg IntraVENous Q4H PRN     No current outpatient medications on file.          Review of Systems:  (bold if positive, if negative)    As in HPI - rest of ROS noncontributory      Physical Exam:  Visit Vitals  BP (!) 149/105   Pulse 76   Temp 97.9 °F (36.6 °C)   Resp 22   Ht 6' 2.5\" (1.892 m)   Wt 275 lb (124.7 kg)   SpO2 92%   BMI 34.84 kg/m²         Telemetry:     Gen: Well-developed, well-nourished, in no acute distress  HEENT:  Pink conjunctivae, hearing intact to voice, moist mucous membranes  Neck: Supple,No JVD, No Carotid Bruit, Thyroid- non tender  Resp: No accessory muscle use, Clear breath sounds, No rales or rhonchi  Card: Irregular Rate,Rythm,Normal S1, S2, No murmurs, rubs or gallop. No thrills.    Abd:  Soft, non-tender, non-distended, normoactive bowel sounds are present,   MSK: No cyanosis or clubbing  Skin: No rashes or ulcers  Neuro:  , moving all four extremities, no focal deficit, follows commands appropriately  Psych:  Good insight, oriented to person, place and time, alert, Nml Affect  LE: No edema  Vascular:Radial Pulses 2+ and symmetric        EKG: Aflutter with CVR/NSTT      Cxray:    LABS:        Lab Results   Component Value Date/Time    WBC 14.5 (H) 02/25/2019 02:56 AM    HGB 12.8 02/25/2019 02:56 AM    HCT 39.0 02/25/2019 02:56 AM    PLATELET 147 09/71/9551 02:56 AM    MCV 85.0 02/25/2019 02:56 AM     Lab Results   Component Value Date/Time    Sodium 138 02/25/2019 02:56 AM    Potassium 3.8 02/25/2019 02:56 AM    Chloride 102 02/25/2019 02:56 AM    CO2 21 02/25/2019 02:56 AM    Anion gap 15 02/25/2019 02:56 AM    Glucose 127 (H) 02/25/2019 02:56 AM    BUN 15 02/25/2019 02:56 AM    Creatinine 1.41 (H) 02/25/2019 02:56 AM    BUN/Creatinine ratio 11 (L) 02/25/2019 02:56 AM    GFR est AA >60 02/25/2019 02:56 AM    GFR est non-AA 53 (L) 02/25/2019 02:56 AM    Calcium 8.5 02/25/2019 02:56 AM     Lab Results   Component Value Date/Time     02/25/2019 02:56 AM    CK-MB Index Cannot be calculated 02/25/2019 02:56 AM    Troponin-I, Qt. <0.05 02/25/2019 02:56 AM     Lab Results   Component Value Date/Time    aPTT 32.5 (H) 02/25/2019 02:56 AM     Lab Results   Component Value Date/Time    INR 1.1 02/25/2019 02:56 AM    Prothrombin time 11.2 (H) 02/25/2019 02:56 AM     No results found for: BNP, BNPP, XBNPT      Fazal Nunez MD

## 2019-02-26 ENCOUNTER — APPOINTMENT (OUTPATIENT)
Dept: NON INVASIVE DIAGNOSTICS | Age: 54
DRG: 309 | End: 2019-02-26
Attending: INTERNAL MEDICINE
Payer: OTHER GOVERNMENT

## 2019-02-26 LAB
25(OH)D3 SERPL-MCNC: 9.7 NG/ML (ref 30–100)
ALBUMIN SERPL-MCNC: 3.6 G/DL (ref 3.5–5)
ALBUMIN/GLOB SERPL: 0.7 {RATIO} (ref 1.1–2.2)
ALP SERPL-CCNC: 80 U/L (ref 45–117)
ALT SERPL-CCNC: 40 U/L (ref 12–78)
ANION GAP SERPL CALC-SCNC: 13 MMOL/L (ref 5–15)
AST SERPL-CCNC: 28 U/L (ref 15–37)
BACTERIA SPEC CULT: NORMAL
BASOPHILS # BLD: 0.1 K/UL (ref 0–0.1)
BASOPHILS NFR BLD: 1 % (ref 0–1)
BILIRUB SERPL-MCNC: 0.5 MG/DL (ref 0.2–1)
BUN SERPL-MCNC: 9 MG/DL (ref 6–20)
BUN/CREAT SERPL: 9 (ref 12–20)
CALCIUM SERPL-MCNC: 8.5 MG/DL (ref 8.5–10.1)
CALCIUM SERPL-MCNC: 8.8 MG/DL (ref 8.5–10.1)
CC UR VC: NORMAL
CHLORIDE SERPL-SCNC: 102 MMOL/L (ref 97–108)
CO2 SERPL-SCNC: 19 MMOL/L (ref 21–32)
CREAT SERPL-MCNC: 0.98 MG/DL (ref 0.7–1.3)
CREAT UR-MCNC: 128.21 MG/DL
DIFFERENTIAL METHOD BLD: ABNORMAL
ECHO AO ASC DIAM: 4 CM
ECHO AO ROOT DIAM: 4.03 CM
ECHO AV AREA PEAK VELOCITY: 4.2 CM2
ECHO AV AREA/BSA PEAK VELOCITY: 1.6 CM2/M2
ECHO AV PEAK GRADIENT: 14.5 MMHG
ECHO AV PEAK VELOCITY: 190.68 CM/S
ECHO IVC SNIFF: 1.33 CM
ECHO LA AREA 4C: 24.5 CM2
ECHO LA MAJOR AXIS: 4.52 CM
ECHO LA TO AORTIC ROOT RATIO: 1.12
ECHO LA VOL 2C: 72.48 ML (ref 18–58)
ECHO LA VOL 4C: 76.16 ML (ref 18–58)
ECHO LA VOL BP: 75.4 ML (ref 18–58)
ECHO LA VOL/BSA BIPLANE: 30.3 ML/M2 (ref 16–28)
ECHO LA VOLUME INDEX A2C: 29.13 ML/M2 (ref 16–28)
ECHO LA VOLUME INDEX A4C: 30.61 ML/M2 (ref 16–28)
ECHO LV E' LATERAL VELOCITY: 13.86 CENTIMETER/SECOND
ECHO LV E' SEPTAL VELOCITY: 11.57 CENTIMETER/SECOND
ECHO LV INTERNAL DIMENSION DIASTOLIC: 4.39 CM (ref 4.2–5.9)
ECHO LV INTERNAL DIMENSION SYSTOLIC: 2.56 CM
ECHO LV IVSD: 1.66 CM (ref 0.6–1)
ECHO LV MASS 2D: 362.7 G (ref 88–224)
ECHO LV MASS INDEX 2D: 145.8 G/M2 (ref 49–115)
ECHO LV POSTERIOR WALL DIASTOLIC: 1.59 CM (ref 0.6–1)
ECHO LVOT DIAM: 2.54 CM
ECHO LVOT PEAK GRADIENT: 9.8 MMHG
ECHO LVOT PEAK VELOCITY: 156.37 CM/S
ECHO MV A VELOCITY: 50.32 CM/S
ECHO MV AREA PHT: 4.9 CM2
ECHO MV E DECELERATION TIME (DT): 155.1 MS
ECHO MV E VELOCITY: 0.76 CM/S
ECHO MV E/A RATIO: 0.02
ECHO MV PRESSURE HALF TIME (PHT): 45 MS
ECHO RV INTERNAL DIMENSION: 3.63 CM
ECHO RV TAPSE: 1.77 CM (ref 1.5–2)
EOSINOPHIL # BLD: 0.3 K/UL (ref 0–0.4)
EOSINOPHIL NFR BLD: 3 % (ref 0–7)
ERYTHROCYTE [DISTWIDTH] IN BLOOD BY AUTOMATED COUNT: 14.6 % (ref 11.5–14.5)
GLOBULIN SER CALC-MCNC: 5.1 G/DL (ref 2–4)
GLUCOSE SERPL-MCNC: 101 MG/DL (ref 65–100)
HCT VFR BLD AUTO: 41.9 % (ref 36.6–50.3)
HGB BLD-MCNC: 13.6 G/DL (ref 12.1–17)
IMM GRANULOCYTES # BLD AUTO: 0 K/UL (ref 0–0.04)
IMM GRANULOCYTES NFR BLD AUTO: 0 % (ref 0–0.5)
LEFT CCA DIST DIAS: 24.1 CM/S
LEFT CCA DIST SYS: 86.2 CM/S
LEFT CCA PROX DIAS: 24 CM/S
LEFT CCA PROX SYS: 149.3 CM/S
LEFT ECA DIAS: 34.89 CM/S
LEFT ECA SYS: 109.2 CM/S
LEFT ICA DIST DIAS: 15 CM/S
LEFT ICA DIST SYS: 55.4 CM/S
LEFT ICA MID DIAS: 16.9 CM/S
LEFT ICA MID SYS: 63.2 CM/S
LEFT ICA PROX DIAS: 11.8 CM/S
LEFT ICA PROX SYS: 35.3 CM/S
LEFT ICA/CCA SYS: 0.73
LEFT SUBCLAVIAN DIAS: 0 CM/S
LEFT SUBCLAVIAN SYS: 133.3 CM/S
LEFT VERTEBRAL DIAS: 15.35 CM/S
LEFT VERTEBRAL SYS: 52 CM/S
LYMPHOCYTES # BLD: 3.1 K/UL (ref 0.8–3.5)
LYMPHOCYTES NFR BLD: 29 % (ref 12–49)
MAGNESIUM SERPL-MCNC: 2 MG/DL (ref 1.6–2.4)
MCH RBC QN AUTO: 27.3 PG (ref 26–34)
MCHC RBC AUTO-ENTMCNC: 32.5 G/DL (ref 30–36.5)
MCV RBC AUTO: 84 FL (ref 80–99)
MONOCYTES # BLD: 0.7 K/UL (ref 0–1)
MONOCYTES NFR BLD: 7 % (ref 5–13)
NEUTS SEG # BLD: 6.4 K/UL (ref 1.8–8)
NEUTS SEG NFR BLD: 60 % (ref 32–75)
NRBC # BLD: 0 K/UL (ref 0–0.01)
NRBC BLD-RTO: 0 PER 100 WBC
PHOSPHATE SERPL-MCNC: 3 MG/DL (ref 2.6–4.7)
PLATELET # BLD AUTO: 221 K/UL (ref 150–400)
PMV BLD AUTO: 11.7 FL (ref 8.9–12.9)
POTASSIUM SERPL-SCNC: 4.1 MMOL/L (ref 3.5–5.1)
PROT SERPL-MCNC: 8.7 G/DL (ref 6.4–8.2)
PROT UR-MCNC: 10 MG/DL (ref 0–11.9)
PTH-INTACT SERPL-MCNC: 171.9 PG/ML (ref 18.4–88)
RBC # BLD AUTO: 4.99 M/UL (ref 4.1–5.7)
RIGHT CCA DIST DIAS: 18 CM/S
RIGHT CCA DIST SYS: 68.5 CM/S
RIGHT CCA PROX DIAS: 16 CM/S
RIGHT CCA PROX SYS: 103.2 CM/S
RIGHT ECA DIAS: 16.3 CM/S
RIGHT ECA SYS: 91.3 CM/S
RIGHT ICA DIST DIAS: 39.1 CM/S
RIGHT ICA DIST SYS: 88.6 CM/S
RIGHT ICA MID DIAS: 22.6 CM/S
RIGHT ICA MID SYS: 57.8 CM/S
RIGHT ICA PROX DIAS: 13.5 CM/S
RIGHT ICA PROX SYS: 32.1 CM/S
RIGHT ICA/CCA SYS: 1.3
RIGHT SUBCLAVIAN DIAS: 0 CM/S
RIGHT SUBCLAVIAN SYS: 103.8 CM/S
RIGHT VERTEBRAL DIAS: 9.68 CM/S
RIGHT VERTEBRAL SYS: 40.3 CM/S
SERVICE CMNT-IMP: NORMAL
SODIUM SERPL-SCNC: 134 MMOL/L (ref 136–145)
WBC # BLD AUTO: 10.6 K/UL (ref 4.1–11.1)

## 2019-02-26 PROCEDURE — 74011250637 HC RX REV CODE- 250/637: Performed by: INTERNAL MEDICINE

## 2019-02-26 PROCEDURE — 74011000250 HC RX REV CODE- 250: Performed by: INTERNAL MEDICINE

## 2019-02-26 PROCEDURE — 83735 ASSAY OF MAGNESIUM: CPT

## 2019-02-26 PROCEDURE — 82306 VITAMIN D 25 HYDROXY: CPT

## 2019-02-26 PROCEDURE — 82570 ASSAY OF URINE CREATININE: CPT

## 2019-02-26 PROCEDURE — 65610000006 HC RM INTENSIVE CARE

## 2019-02-26 PROCEDURE — 80053 COMPREHEN METABOLIC PANEL: CPT

## 2019-02-26 PROCEDURE — 74011250636 HC RX REV CODE- 250/636: Performed by: INTERNAL MEDICINE

## 2019-02-26 PROCEDURE — 85025 COMPLETE CBC W/AUTO DIFF WBC: CPT

## 2019-02-26 PROCEDURE — 84156 ASSAY OF PROTEIN URINE: CPT

## 2019-02-26 PROCEDURE — 84100 ASSAY OF PHOSPHORUS: CPT

## 2019-02-26 PROCEDURE — 93306 TTE W/DOPPLER COMPLETE: CPT

## 2019-02-26 PROCEDURE — 74011000258 HC RX REV CODE- 258: Performed by: INTERNAL MEDICINE

## 2019-02-26 PROCEDURE — 83970 ASSAY OF PARATHORMONE: CPT

## 2019-02-26 PROCEDURE — 74011250637 HC RX REV CODE- 250/637: Performed by: NURSE PRACTITIONER

## 2019-02-26 PROCEDURE — 36415 COLL VENOUS BLD VENIPUNCTURE: CPT

## 2019-02-26 RX ORDER — LISINOPRIL 20 MG/1
20 TABLET ORAL DAILY
Status: DISCONTINUED | OUTPATIENT
Start: 2019-02-26 | End: 2019-02-28 | Stop reason: HOSPADM

## 2019-02-26 RX ADMIN — SODIUM CHLORIDE 75 ML/HR: 900 INJECTION, SOLUTION INTRAVENOUS at 07:02

## 2019-02-26 RX ADMIN — CARVEDILOL 3.12 MG: 3.12 TABLET, FILM COATED ORAL at 17:20

## 2019-02-26 RX ADMIN — SODIUM CHLORIDE 10 MG/HR: 900 INJECTION, SOLUTION INTRAVENOUS at 19:00

## 2019-02-26 RX ADMIN — Medication 10 ML: at 17:25

## 2019-02-26 RX ADMIN — NITROGLYCERIN 1 INCH: 20 OINTMENT TOPICAL at 17:21

## 2019-02-26 RX ADMIN — SODIUM CHLORIDE 15 MG/HR: 900 INJECTION, SOLUTION INTRAVENOUS at 08:58

## 2019-02-26 RX ADMIN — Medication 10 ML: at 05:15

## 2019-02-26 RX ADMIN — SODIUM CHLORIDE 15 MG/HR: 900 INJECTION, SOLUTION INTRAVENOUS at 07:05

## 2019-02-26 RX ADMIN — SODIUM CHLORIDE 5 MG/HR: 900 INJECTION, SOLUTION INTRAVENOUS at 22:16

## 2019-02-26 RX ADMIN — CARVEDILOL 3.12 MG: 3.12 TABLET, FILM COATED ORAL at 08:09

## 2019-02-26 RX ADMIN — NITROGLYCERIN 1 INCH: 20 OINTMENT TOPICAL at 08:10

## 2019-02-26 RX ADMIN — SODIUM CHLORIDE 15 MG/HR: 900 INJECTION, SOLUTION INTRAVENOUS at 05:11

## 2019-02-26 RX ADMIN — Medication 10 ML: at 23:44

## 2019-02-26 RX ADMIN — AMLODIPINE BESYLATE 10 MG: 5 TABLET ORAL at 09:00

## 2019-02-26 RX ADMIN — Medication 10 ML: at 05:14

## 2019-02-26 RX ADMIN — SODIUM CHLORIDE 15 MG/HR: 900 INJECTION, SOLUTION INTRAVENOUS at 03:23

## 2019-02-26 RX ADMIN — SODIUM CHLORIDE 5 MG/HR: 900 INJECTION, SOLUTION INTRAVENOUS at 14:27

## 2019-02-26 RX ADMIN — DOCUSATE SODIUM 100 MG: 100 CAPSULE, LIQUID FILLED ORAL at 08:09

## 2019-02-26 RX ADMIN — LABETALOL 20 MG/4 ML (5 MG/ML) INTRAVENOUS SYRINGE 20 MG: at 03:42

## 2019-02-26 RX ADMIN — SODIUM CHLORIDE 12.5 MG/HR: 900 INJECTION, SOLUTION INTRAVENOUS at 11:05

## 2019-02-26 RX ADMIN — DOCUSATE SODIUM 100 MG: 100 CAPSULE, LIQUID FILLED ORAL at 17:20

## 2019-02-26 RX ADMIN — APIXABAN 5 MG: 5 TABLET, FILM COATED ORAL at 13:08

## 2019-02-26 RX ADMIN — ASPIRIN 81 MG: 81 TABLET ORAL at 08:10

## 2019-02-26 RX ADMIN — CEFTRIAXONE SODIUM 1 G: 1 INJECTION, POWDER, FOR SOLUTION INTRAMUSCULAR; INTRAVENOUS at 08:10

## 2019-02-26 RX ADMIN — APIXABAN 5 MG: 5 TABLET, FILM COATED ORAL at 17:21

## 2019-02-26 RX ADMIN — SODIUM CHLORIDE 10 MG/HR: 900 INJECTION, SOLUTION INTRAVENOUS at 00:45

## 2019-02-26 RX ADMIN — LISINOPRIL 20 MG: 20 TABLET ORAL at 09:00

## 2019-02-26 NOTE — PROGRESS NOTES
1900 received bedside SBAR report from off going nurse, Lorna Epstein. Plan of care reviewed. Patient to be started on Cardene drip. During report patient kept saying to his wife \" I am leaving tomorrow at 3pm, whether they like it or not. They can not keep me here\". Patient also stated to his wife he needed to urinate, she asked him to wait until the nurses were done with report; patient then stated \" They are done when I say they are done, do you want me to ask them to leave\". Nurses left room to give patient privacy to urinate. Assessment to follow. Currently connected to 24 hour EEG. 1930 Cardene drip started, new peripheral IV obtained. Patient requested a larger gown- gown changed. 2000 patient assessed. See flowsheet. 2300 Bedside and Verbal shift change report given to Eduardo Issa RN (oncoming nurse) by [employer] RN (offgoing nurse). Report included the following information SBAR, Kardex, Intake/Output, MAR, Recent Results and Cardiac Rhythm Aflutter .

## 2019-02-26 NOTE — ROUTINE PROCESS
0715: Bedside shift change report received from [facility], RN (offgoing nurse). Report included the following information SBAR, Kardex, Procedure Summary, Intake/Output, MAR, Accordion, Recent Results, Cardiac Rhythm Aflutter and Alarm Parameters . 1912: Bedside shift change report given to Kevin Crow RN (oncoming nurse) by Romie Heller (offgoing nurse). Report included the following information SBAR, Kardex, Procedure Summary, Intake/Output, MAR, Accordion, Recent Results, Cardiac Rhythm Aflutter and Alarm Parameters .

## 2019-02-26 NOTE — PROGRESS NOTES
Bedside shift change report given to Stephanie Flowers (oncoming nurse) by Spenser Meadwos (offgoing nurse). Report included the following information SBAR, Intake/Output, MAR, Accordion and Recent Results. Bedside shift change report given to (oncoming nurse) by Frankie Lr RN (offgoing nurse). Report included the following information SBAR, Intake/Output, MAR, Accordion and Recent Results.

## 2019-02-26 NOTE — PROGRESS NOTES
@8247 TRANSFER - IN REPORT: 
 
Verbal report received from Heriberto Ford RN(name) on Calleen Ready  being received from MIU(unit) for change in patient condition(Hypertension) Report consisted of patients Situation, Background, Assessment and  
Recommendations(SBAR). Information from the following report(s) SBAR, Kardex, ED Summary, Intake/Output, MAR, Accordion, Recent Results, Med Rec Status, Cardiac Rhythm Aflutter and Alarm Parameters  was reviewed with the receiving nurse. Opportunity for questions and clarification was provided. Assessment completed upon patients arrival to unit and care assumed. @9890 Patient transfer to ICU 7. Request sent for cardene drip. 
 
@1820 Called pharmacy about cardene drip after searching [facility] and [facility]. Pharmacy informed me that they are making a new drip. 
 
@1857 Labetalol 20 mg IVP given for elevated BP. 
 
@1900 Bedside and Verbal shift change report given to Renee Peterson (oncoming nurse) by Janis Aceves RN (offgoing nurse). Report included the following information SBAR, Kardex, ED Summary, Intake/Output, MAR, Accordion, Recent Results, Med Rec Status, Cardiac Rhythm Aflutter and Alarm Parameters .

## 2019-02-26 NOTE — PROGRESS NOTES
Shift Summary: 
 
0730:  Initial assessment complete as charted and ongoing as per unit protocol. Pt is awake, a&ox4, expressing desire to be d/c'd home. VSS at this time, on Cardene gtt for BP control, titrating as per parameters. See flowsheet for VS trends and gtt titratrions. Educated as to risks with elevated BP, aflutter and need for continuous Cardene infusion, discussed POC for shift. 24 hour EEG in progress. 0830: Breakfast tray cleared, pt ate 100% of meal.  EEG tech at bedside to remove 24 hour EEG equipment. 0900:  Dr. Orlando Roy at pt's bedside, assessed and discussed POC with patient, questions addressed. 1000: Cardiology NP in to see patient. Discussed plan for inpatient cardiac stress testing. 
 
1200:  No acute changes in assessment. Pt refusing to use urinal, stands to use toilet independently. Nursing provided extensive education about calling staff for assist before getting up and risk for falls. Wife at bedside. Weaning Cardene gtt as tolerated. See flowsheet for titration. 1400:  Pt resting quietly in bed. Eyes closed. respirs nonlabored. Cardene gtt infusing, VSS. 
 
1530:  ECHO in progress at bedside. 1600:  Pt resting quietly in bed, eyes closed, respirs regular and nonlabored. Assessment remains unchanged. Will continue to monitor. 1730:  Pt denies complaints at this time. Titrating Cardene per ordered parameters. Please see flowsheet for VS and titrations. 1819:  BP via cuff elevated 160's/100's, BP cuff repositioned on right arm and rechecked at 146/86. Cardene gtt continues. No changes at this time.

## 2019-02-26 NOTE — CONSULTS
[facility]     Name: Marcelina Chavira MRN: [id number]   : 1965 Hospital: [address]   Date: 2019        Impression Plan   1. HTN  2. A flutter  3. Syncope  4. RAJ, improving  5. Emphsyema  6. Suspected CANDICE               · Norvasc, coreg, lisinopril added  · Nicardipine drip, attempt to wean off  · Cardiology consulted  · TTE pending  · Renal following  · Neurology following, EEG   · Nicotine patch  · Tobacco cessation education  · Needs outpatient pulmonary follow-up with PFTs as well as sleep evaluation if amenable  · ASA  · Received one dose of therapeutic lovenox, will defer to cardiology whether or not he needs treatment doses  · Cardiac diet       Radiology  ( personally reviewed) CTA chest without PE, lungs clear, +emphysematous changes  CT head with white matter disease, old lacunar infarcts  MRI brain without acute intracranial hemorrhage or infarct  Carotid dopplers with 0-49% stenosis bilaterally  Renal US with small left renal cyst, otherwise normal   ABG No results for input(s): PHI, PO2I, PCO2I in the last 72 hours. Subjective     Patient is a 48 y.o. male with a history of HTN and tobacco abuse who presented with syncope, found to have atrial flutter and uncontrolled BP. Transferred to the ICU for a nicardipine drip. Had reported that prior to arrival to the ED was sitting at home and passed out for several minutes. Described intermittent palpitations without chest pain, shortness of breath, edema, focal deficits. He is inquiring if he will go home today and is disappointed to find out that he will not be considered safe for discharge at any point today. Nicardipine at 15. Review of Systems:  A comprehensive review of systems was negative except for that written in the HPI. Past Medical History:   Diagnosis Date    Hypertension     Palpitations       No past surgical history on file.    Prior to Admission medications    Not on File Current Facility-Administered Medications   Medication Dose Route Frequency    lisinopril (PRINIVIL, ZESTRIL) tablet 20 mg  20 mg Oral DAILY    0.9% sodium chloride infusion  25 mL/hr IntraVENous CONTINUOUS    sodium chloride (NS) flush 5-40 mL  5-40 mL IntraVENous Q8H    docusate sodium (COLACE) capsule 100 mg  100 mg Oral BID    cefTRIAXone (ROCEPHIN) 1 g in 0.9% sodium chloride (MBP/ADV) 50 mL  1 g IntraVENous Q24H    carvedilol (COREG) tablet 3.125 mg  3.125 mg Oral BID WITH MEALS    nitroglycerin (NITROBID) 2 % ointment 1 Inch  1 Inch Topical BID    amLODIPine (NORVASC) tablet 10 mg  10 mg Oral DAILY    niCARdipine (CARDENE) 25 mg in 0.9% sodium chloride 250 mL infusion  0-15 mg/hr IntraVENous TITRATE    nicotine (NICODERM CQ) 21 mg/24 hr patch 1 Patch  1 Patch TransDERmal DAILY    sodium chloride (NS) flush 5-40 mL  5-40 mL IntraVENous Q8H    aspirin delayed-release tablet 81 mg  81 mg Oral DAILY     No Known Allergies   Social History     Tobacco Use    Smoking status: Smoker, Current Status Unknown   Substance Use Topics    Alcohol use: No     Frequency: Never      Family History   Problem Relation Age of Onset    Hypertension Father     Hypertension Mother           Laboratory: I have personally reviewed the critical care flowsheet and labs.      Recent Labs     02/26/19  0355 02/25/19  0256 02/24/19 2019   WBC 10.6 14.5* 8.9   HGB 13.6 12.8 12.5   HCT 41.9 39.0 38.2    223 199     Recent Labs     02/26/19  0355 02/25/19  0256 02/24/19 2019   * 138 138   K 4.1 3.8 4.3    102 103   CO2 19* 21 27   * 127* 113*   BUN 9 15 16   CREA 0.98 1.41* 1.68*   CA 8.8 8.5 8.6   MG 2.0 2.3 1.9   PHOS 3.0 3.8  --    ALB 3.6 3.4* 3.3*   SGOT 28 24 21   ALT 40 43 42   INR  --  1.1  --        Objective:     Mode Rate Tidal Volume Pressure FiO2 PEEP                    Vital Signs:     TMAX(24)      Intake/Output:   Last shift:         Last 3 shifts: 02/26 0701 - 02/26 1900  In: 225 [I.V.:225]  Out: 600 [Urine:600][id number]    Intake/Output Summary (Last 24 hours) at 2/26/2019 3218  Last data filed at 2/26/2019 4388  Gross per 24 hour   Intake 3633.33 ml   Output 3900 ml   Net -266.67 ml     EXAM:   GENERAL: well developed and in no distress, HEENT:  PERRL, EOMI, no alar flaring or epistaxis, oral mucosa moist without cyanosis, NECK:  no jugular vein distention, no retractions, no thyromegaly or masses, LUNGS: CTAB, respirations non-labored, HEART:  Regular rate and rhythm with no MGR; no edema is present, ABDOMEN:  soft with no tenderness, bowel sounds present, EXTREMITIES:  warm with no cyanosis, SKIN:  no jaundice or ecchymosis and NEUROLOGIC:  alert and oriented, grossly non-focal    Abdi Berry MD  [facility]

## 2019-02-26 NOTE — PROGRESS NOTES
Reason for Admission:   HTN,atrial flutter,syncope RRAT Score:      0 Plan for utilizing home health:   Pt is [facility] pt. There are no identified home health or H2H needs. Likelihood of Readmission:  Low/green Transition of Care Plan:                  I met with pt to discuss discharge needs. Pt is  and resides @ the address on demographics. Pt states he gets all of his prescriptions filled @ the [facility]. 
With his nurse present,I gave pt his script for eliqus and explained to pt that it is extremely important that he has his girlfriend fill his eliquis script @ the [facility] (preferably before discharge) to insure he has his evening dose of eliquis if he is discharged home tomorrow. Pt stated he understands [facility] is [facility] (if not before his evening dose). I will follow-up in the am to see if pt has any other discharge needs. The plan is for pt to follow-up in the outpatient setting with cardiologist,Dr Ahumada.  
Claude Shrestha

## 2019-02-26 NOTE — PROGRESS NOTES
[facility], [place] KIDNEY Renal Daily Progress Note:  
 
Admission Date: 2019 Subjective: chart reviewed and patient examined. Remains in A-flutter, now on Nicardipine drip. No chest pain, SOB or N/V. No abd pain. No lower ext edema. Review of Systems Pertinent items are noted in HPI. Objective:  
 
Visit Vitals BP (!) 152/92 Pulse 88 Temp 97.8 °F (36.6 °C) Resp 28 Ht 6' 2.5\" (1.892 m) Wt 124.7 kg (275 lb) SpO2 94% BMI 34.84 kg/m² Temp (24hrs), Av.9 °F (36.6 °C), Min:97.8 °F (36.6 °C), Max:98.2 °F (36.8 °C) Intake/Output Summary (Last 24 hours) at 2019 3374 Last data filed at 2019 9615 Gross per 24 hour Intake 3033.33 ml Output 3500 ml Net -466.67 ml  
 
Current Facility-Administered Medications Medication Dose Route Frequency  0.9% sodium chloride infusion  75 mL/hr IntraVENous CONTINUOUS  
 sodium chloride (NS) flush 5-40 mL  5-40 mL IntraVENous Q8H  
 sodium chloride (NS) flush 5-40 mL  5-40 mL IntraVENous PRN  
 acetaminophen (TYLENOL) tablet 650 mg  650 mg Oral Q4H PRN  
 HYDROcodone-acetaminophen (NORCO) 5-325 mg per tablet 1 Tab  1 Tab Oral Q4H PRN  
 HYDROmorphone (PF) (DILAUDID) injection 0.5 mg  0.5 mg IntraVENous Q4H PRN  
 naloxone (NARCAN) injection 0.4 mg  0.4 mg IntraVENous PRN  
 diphenhydrAMINE (BENADRYL) injection 12.5 mg  12.5 mg IntraVENous Q4H PRN  
 ondansetron (ZOFRAN) injection 4 mg  4 mg IntraVENous Q6H PRN  
 docusate sodium (COLACE) capsule 100 mg  100 mg Oral BID  cefTRIAXone (ROCEPHIN) 1 g in 0.9% sodium chloride (MBP/ADV) 50 mL  1 g IntraVENous Q24H  hydrALAZINE (APRESOLINE) 20 mg/mL injection 10 mg  10 mg IntraVENous Q6H PRN  
 carvedilol (COREG) tablet 3.125 mg  3.125 mg Oral BID WITH MEALS  nitroglycerin (NITROBID) 2 % ointment 1 Inch  1 Inch Topical BID  amLODIPine (NORVASC) tablet 10 mg  10 mg Oral DAILY  niCARdipine (CARDENE) 25 mg in 0.9% sodium chloride 250 mL infusion 0-15 mg/hr IntraVENous TITRATE  nicotine (NICODERM CQ) 21 mg/24 hr patch 1 Patch  1 Patch TransDERmal DAILY  sodium chloride (NS) flush 5-40 mL  5-40 mL IntraVENous Q8H  
 sodium chloride (NS) flush 5-40 mL  5-40 mL IntraVENous PRN  
 aspirin delayed-release tablet 81 mg  81 mg Oral DAILY  labetalol (NORMODYNE;TRANDATE) 20 mg/4 mL (5 mg/mL) injection 20 mg  20 mg IntraVENous Q4H PRN Physical Exam:General appearance: alert, cooperative, no distress, appears stated age Neck: supple, symmetrical, trachea midline, no adenopathy, thyroid: not enlarged, symmetric, no tenderness/mass/nodules and no JVD Lungs: clear to auscultation bilaterally Heart: irregularly irregular rhythm, no S3 or S4, no rub Abdomen: soft, non-tender. Bowel sounds normal. No masses,  no organomegaly Extremities: no edema Neurologic: Grossly normal 
 
Data Review:  
 
LABS: 
Recent Labs  
  02/26/19 
0355 02/25/19 0256 02/24/19 2019 * 138 138  
K 4.1 3.8 4.3  102 103 CO2 19* 21 27 BUN 9 15 16 CREA 0.98 1.41* 1.68* CA 8.8 8.5 8.6 ALB 3.6 3.4* 3.3*  
PHOS 3.0 3.8  --   
MG 2.0 2.3 1.9 Recent Labs  
  02/26/19 
0355 02/25/19 
0256 02/24/19 2019 WBC 10.6 14.5* 8.9 HGB 13.6 12.8 12.5 HCT 41.9 39.0 38.2  223 199 Recent Labs  
  02/25/19 
0023 DEVAN 100 CREAU 110.30  
UA neg protein by dipstick Renal US 2-25-19 FINDINGS: The right kidney measures 10.6 cm in length. The left kidney measures 10.0 cm in length. Both kidneys demonstrate normal cortical echogenicity. No 
renal calculus is seen. There is no hydronephrosis. There is a 1.3 cm cyst in 
the interpolar region of the left kidney. The abdominal aorta is normal in 
caliber. The common iliac artery origins are normal. The urinary bladder 
demonstrates no filling defect. The inferior vena cava is patent. 
  
IMPRESSION IMPRESSION: Small left renal cyst. Otherwise normal renal ultrasound. Assessment: Renal Specific Problems RAJ resolved Probably CKD 2 Mild hypoalbumin Hypertension Plan:  
 
Obtain/ Order: labs/cultures/radiology/procedures:  spot urine protein and creatinine ratio Therapeutic:   
Decrease IVF rate Add lisinopril Titrate off Nicardipine (defer to CV/Primary team) Yordan Tompkins MD   
[phone]

## 2019-02-26 NOTE — PROGRESS NOTES
Jamal Latif sheela Mauldin 81 
[address] 
[phone] Medical Progress Note NAME: Toby Mathews :  1965 MRM:  [id number] Date/Time: 2019 Assessment / Plan:  
 
  Uncontrolled hypertension: requiring transfer to ICU for Cardene gtt; attempting to wean off. Added Norvasc, Coreg, lisinopril, nitropaste New onset atrial flutter: rate controlled. Started on Eliquis. Continue Coreg Syncope and collapse: unclear etiology. ?tachyarrhthmia. MRI brain showed old lacunar infarct. Monitor on tele. BP control. TTE. Appreciate neuro evaluation, EEG negative for seizures RAJ (acute kidney injury): improved/resolved. Monitor BMP. Renal US unremarkable. Obesity: likely has CANDICE. Needs outpatient polysomnography. Total time spent: 35 minutes Time spent in the care of this patient including reviewing records, discussing with nursing and/or other providers on the treatment team, obtaining history and examining the patient, and discussing treatment plans. Care Plan discussed with: Patient, Nursing Staff and >50% of time spent in counseling and coordination of care Discussed:  Care Plan and D/C Planning Prophylaxis:  Lovenox Disposition:  Home w/Family Subjective: Chief Complaint:  Follow up syncope Chart/notes/labs/studies reviewed, patient examined at bedside. Denies CP, SOB. No f/c 
  
 
  
Objective:  
 
 
Vitals:  
 
  
Last 24hrs VS reviewed since prior progress note. Most recent are: 
 
Visit Vitals /87 Pulse 86 Temp 97.5 °F (36.4 °C) Resp 20 Ht 6' 2\" (1.88 m) Wt 124.7 kg (275 lb) SpO2 100% BMI 35.31 kg/m² SpO2 Readings from Last 6 Encounters:  
19 100% Intake/Output Summary (Last 24 hours) at 2019 1608 Last data filed at 2019 1510 Gross per 24 hour Intake 4541.66 ml Output 4100 ml Net 441.66 ml Exam: Physical Exam: 
 
Gen: Obese. Chronically ill-appearing. NAD HEENT:  Sclerae nonicteric, hearing intact to voice, mucous membranes moist 
Neck:  Supple, without masses. Resp:  No accessory muscle use, CTAB without wheezes, rales, or rhonchi 
Card: RRR, without m/r/g. No LE edema. Abd:  +bowel sounds, soft, NTTP, nondistended. No HSM. Neuro: Face symmetric, tongue midline, speech fluent, follows commands appropriately Psych:  Alert, oriented x 3. Fair insight. Flat affect Medications Reviewed: (see below) Lab Data Reviewed: (see below) 
 
______________________________________________________________________ Medications:  
 
Current Facility-Administered Medications Medication Dose Route Frequency  lisinopril (PRINIVIL, ZESTRIL) tablet 20 mg  20 mg Oral DAILY  apixaban (ELIQUIS) tablet 5 mg  5 mg Oral BID  
 0.9% sodium chloride infusion  25 mL/hr IntraVENous CONTINUOUS  
 sodium chloride (NS) flush 5-40 mL  5-40 mL IntraVENous Q8H  
 sodium chloride (NS) flush 5-40 mL  5-40 mL IntraVENous PRN  
 acetaminophen (TYLENOL) tablet 650 mg  650 mg Oral Q4H PRN  
 HYDROcodone-acetaminophen (NORCO) 5-325 mg per tablet 1 Tab  1 Tab Oral Q4H PRN  
 HYDROmorphone (PF) (DILAUDID) injection 0.5 mg  0.5 mg IntraVENous Q4H PRN  
 naloxone (NARCAN) injection 0.4 mg  0.4 mg IntraVENous PRN  
 diphenhydrAMINE (BENADRYL) injection 12.5 mg  12.5 mg IntraVENous Q4H PRN  
 ondansetron (ZOFRAN) injection 4 mg  4 mg IntraVENous Q6H PRN  
 docusate sodium (COLACE) capsule 100 mg  100 mg Oral BID  cefTRIAXone (ROCEPHIN) 1 g in 0.9% sodium chloride (MBP/ADV) 50 mL  1 g IntraVENous Q24H  hydrALAZINE (APRESOLINE) 20 mg/mL injection 10 mg  10 mg IntraVENous Q6H PRN  
 carvedilol (COREG) tablet 3.125 mg  3.125 mg Oral BID WITH MEALS  nitroglycerin (NITROBID) 2 % ointment 1 Inch  1 Inch Topical BID  amLODIPine (NORVASC) tablet 10 mg  10 mg Oral DAILY  niCARdipine (CARDENE) 25 mg in 0.9% sodium chloride 250 mL infusion  0-15 mg/hr IntraVENous TITRATE  nicotine (NICODERM CQ) 21 mg/24 hr patch 1 Patch  1 Patch TransDERmal DAILY  sodium chloride (NS) flush 5-40 mL  5-40 mL IntraVENous Q8H  
 sodium chloride (NS) flush 5-40 mL  5-40 mL IntraVENous PRN  
 aspirin delayed-release tablet 81 mg  81 mg Oral DAILY  labetalol (NORMODYNE;TRANDATE) 20 mg/4 mL (5 mg/mL) injection 20 mg  20 mg IntraVENous Q4H PRN Lab Review:  
 
Recent Labs  
  02/26/19 
0355 02/25/19 0256 02/24/19 2019 WBC 10.6 14.5* 8.9 HGB 13.6 12.8 12.5 HCT 41.9 39.0 38.2  223 199 Recent Labs  
  02/26/19 
0355 02/25/19 0256 02/24/19 2019 * 138 138  
K 4.1 3.8 4.3  102 103 CO2 19* 21 27 * 127* 113* BUN 9 15 16 CREA 0.98 1.41* 1.68* CA 8.5  8.8 8.5 8.6 MG 2.0 2.3 1.9 PHOS 3.0 3.8  --   
ALB 3.6 3.4* 3.3* SGOT 28 24 21 ALT 40 43 42 INR  --  1.1  -- No components found for: [place] No results for input(s): PH, PCO2, PO2, HCO3, FIO2 in the last 72 hours. Recent Labs  
  02/25/19 0256 INR 1.1 No results found for: SDES Lab Results Component Value Date/Time Culture result: NO GROWTH 1 DAY 02/24/2019 09:53 PM  
 
        
___________________________________________________ Attending Physician: Carlen Schirmer, MD

## 2019-02-26 NOTE — PROCEDURES
Name: Tuan Restrepo  : 1965  Age: 48 y.o. Admit Date: 2019  MRN: [id number]  Date of EE2019  Patient Location: [facility]       CONTINUOUS ELECTROENCEPHALOGRAM REPORT    PROCEDURE: 24 HR Continuous telemetry EEG monitoring with simultaneous video    EEG PROCEDURE NUMBER: [id number]    RECORDING START TIME: 19 @     RECORDING END TIME: 19 @ 0820     TECHNICAL NOTES:  This recording was performed on equipment with 32 channel capability using scalp electrodes. EEG and video-audio apparatus specifications in accordance with ASCN guidelines. Additional EKG monitoring was utilized. Scalp electrodes were placed in accordance with the International 10-20 system. Additional inferior temporal electrodes were placed at F9, F10, T9, T10, P9, and P10. DESCRIPTION OF THE RECORDING: Continuous telemetry EEG monitoring with simultaneous video was requested in this 48 y.o. male   to appropriately capture and characterize the episodes of clinical interest for accurate diagnosis. INDICATION FOR cEEG:   subclinical seizures    ANTIEPILEPTIC DRUGS (AEDS): None    SEDATIVES:  None    INTUBATION:  No    PUSH BUTTON EVENTS: No    FINDINGS:     BACKGROUND:     Hemispheric Symmetric:  Alpha, not posterior dominant rhythm   Posterior dominant alpha at 9 Hz  Frequent superimposed beta t 13-14 Hz    Hemispheric Asymmetric: none    FOCAL SLOWING: none    AMPLITUDE:   Left: Normal  Right: Normal    VARIABILITY: Yes    CONTINUITY:  continuous    REACTIVITY: Yes    BREACH EFFECT: No    STAGE II SLEEP (K complex and spindles):  Present    EEG IMPRESSION:  normal    CLINICAL CORRELATION:     24hr video EEG with simultaneous video monitoring failed to demonstrate any clinical events for review. Interictal EEG was normal. No seizures were recorded.  Clinical correlation recommended      Juan A Chu MD  2019  9:07 AM

## 2019-02-26 NOTE — PROGRESS NOTES
Cardiology Progress Note       NAME:  Abner Suresh :   1965 MRN:   [id number] Assessment/Plan: 1. A flutter CVR: cont coreg, lovenox. ECHO pending. Will need ischemia work up stress if ECHO nml/ cath if LVEF decline. Will need stability of BP first. Needs [address] but is self pay. Will have CM look in to [facility] pt assistance program. . Start [address] as no invasive procedures are planned. Plan for stress nuc tomorrow if BP stable and EF wnl on ECHO. Consider OP EP eval for a flutter ablation. 2. Syncope: no further episodes 3. HTN: cont BB, Norvasc, wean cardene 4. Renal insufficiency 5. Leucocytosis: 6. RAJ: improved, renal following 7. Old CVA on CT head: neurology following, EEG pending Pt personally seen and examined. Chart reviewed. Agree with advanced NP's history, exam and  A/P with changes/additons. 19 ECHO ADULT COMPLETE 2019 Narrative · Estimated left ventricular ejection fraction is 56 - 60%. Left  
ventricular moderate concentric hypertrophy. Normal left ventricular wall  
motion, no regional wall motion abnormality noted. Signed by: Sharri Zhou MD  
 
 
Discussed with patient/nursing/ 
 
Molly Chirinos. MD, [facility] Subjective:  
47 yo hx of HTN, admitted for aflutter / syncope.  The patient was apparently sitting at home  when he \"passed out\" for several minutes. Pt does not remember the episode.  His mother found him and called EMS. No CP/dyspnea/swelling LE. In ED - EKG - aflutter with CVR. Trop WNL; WBC 14.5, Cr 1.4 No prior hx of CAD/arrhythmia Drug screen + THC 
 
CT Head - lacunar infarcts 
  
 
Cardiac ROS: Patient denies any exertional chest pain, dyspnea, palpitations, syncope, orthopnea, edema or paroxysmal nocturnal dyspnea. Review of Systems: No nausea, indigestion, vomiting, pain, cough, sputum. No bleeding. Taking po. Appetite good.  OOB with assist.  
 
 
 
 
 Objective:  
 
Visit Vitals BP (!) 152/92 Pulse 88 Temp 97.8 °F (36.6 °C) Resp 28 Ht 6' 2.5\" (1.892 m) Wt 275 lb (124.7 kg) SpO2 94% BMI 34.84 kg/m² O2 Device: Room air Temp (24hrs), Av.9 °F (36.6 °C), Min:97.8 °F (36.6 °C), Max:98.2 °F (36.8 °C) 
 
 
701 - 1900 In: -  
Out: 200 [Urine:200] 1901 - 700 In: 3355.8 [P.O.:240; I.V.:3115.8] Out: 3900 [ODUWA:4685] TELE: a flutter General: AAOx3 cooperative, no acute distress. HEENT: Atraumatic. Pink and moist.  Anicteric sclerae. Neck : Supple, no thyromegaly. Lungs: CTA bilaterally. No wheezing/rhonchi/rales. Heart: irregular rhythm, no murmur, no rubs, no gallops. No JVD. No carotid bruits. Abdomen: Soft, non-distended, non-tender. + Bowel sounds. No bruits. Extremities: No edema, no clubbing, no cyanosis. No calf tenderness Neurologic: Grossly intact. Alert and oriented X 3. No acute neurological distress. Psych: Good insight. Not anxious nor agitated. Care Plan discussed with: 
  Comments Patient x Family   significant other RN x Care Manager x Consultant:  x intensivist   
 
 
Data Review: No lab exists for component: ITNL Recent Labs  
  19  CKMB <1.0  
TROIQ <0.05 Recent Labs  
  19 
0355 19 
0256 19 * 138 138  
K 4.1 3.8 4.3  102 103 CO2 19* 21 27 BUN 9 15 16 CREA 0.98 1.41* 1.68* * 127* 113* PHOS 3.0 3.8  --   
MG 2.0 2.3 1.9 ALB 3.6 3.4* 3.3* WBC 10.6 14.5* 8.9 HGB 13.6 12.8 12.5 HCT 41.9 39.0 38.2  223 199 Recent Labs  
  19 
0256 INR 1.1 PTP 11.2* APTT 32.5* Medications reviewed Current Facility-Administered Medications Medication Dose Route Frequency  0.9% sodium chloride infusion  75 mL/hr IntraVENous CONTINUOUS  
 sodium chloride (NS) flush 5-40 mL  5-40 mL IntraVENous Q8H  
  sodium chloride (NS) flush 5-40 mL  5-40 mL IntraVENous PRN  
 acetaminophen (TYLENOL) tablet 650 mg  650 mg Oral Q4H PRN  
 HYDROcodone-acetaminophen (NORCO) 5-325 mg per tablet 1 Tab  1 Tab Oral Q4H PRN  
 HYDROmorphone (PF) (DILAUDID) injection 0.5 mg  0.5 mg IntraVENous Q4H PRN  
 naloxone (NARCAN) injection 0.4 mg  0.4 mg IntraVENous PRN  
 diphenhydrAMINE (BENADRYL) injection 12.5 mg  12.5 mg IntraVENous Q4H PRN  
 ondansetron (ZOFRAN) injection 4 mg  4 mg IntraVENous Q6H PRN  
 docusate sodium (COLACE) capsule 100 mg  100 mg Oral BID  cefTRIAXone (ROCEPHIN) 1 g in 0.9% sodium chloride (MBP/ADV) 50 mL  1 g IntraVENous Q24H  hydrALAZINE (APRESOLINE) 20 mg/mL injection 10 mg  10 mg IntraVENous Q6H PRN  
 carvedilol (COREG) tablet 3.125 mg  3.125 mg Oral BID WITH MEALS  nitroglycerin (NITROBID) 2 % ointment 1 Inch  1 Inch Topical BID  amLODIPine (NORVASC) tablet 10 mg  10 mg Oral DAILY  niCARdipine (CARDENE) 25 mg in 0.9% sodium chloride 250 mL infusion  0-15 mg/hr IntraVENous TITRATE  nicotine (NICODERM CQ) 21 mg/24 hr patch 1 Patch  1 Patch TransDERmal DAILY  sodium chloride (NS) flush 5-40 mL  5-40 mL IntraVENous Q8H  
 sodium chloride (NS) flush 5-40 mL  5-40 mL IntraVENous PRN  
 aspirin delayed-release tablet 81 mg  81 mg Oral DAILY  labetalol (NORMODYNE;TRANDATE) 20 mg/4 mL (5 mg/mL) injection 20 mg  20 mg IntraVENous Q4H PRN Chandana Jarquin NP

## 2019-02-27 ENCOUNTER — APPOINTMENT (OUTPATIENT)
Dept: NON INVASIVE DIAGNOSTICS | Age: 54
DRG: 309 | End: 2019-02-27
Attending: NURSE PRACTITIONER
Payer: OTHER GOVERNMENT

## 2019-02-27 ENCOUNTER — APPOINTMENT (OUTPATIENT)
Dept: NUCLEAR MEDICINE | Age: 54
DRG: 309 | End: 2019-02-27
Attending: NURSE PRACTITIONER
Payer: OTHER GOVERNMENT

## 2019-02-27 LAB
ANION GAP SERPL CALC-SCNC: 13 MMOL/L (ref 5–15)
BUN SERPL-MCNC: 11 MG/DL (ref 6–20)
BUN/CREAT SERPL: 10 (ref 12–20)
CALCIUM SERPL-MCNC: 8.4 MG/DL (ref 8.5–10.1)
CHLORIDE SERPL-SCNC: 103 MMOL/L (ref 97–108)
CO2 SERPL-SCNC: 20 MMOL/L (ref 21–32)
CREAT SERPL-MCNC: 1.08 MG/DL (ref 0.7–1.3)
ERYTHROCYTE [DISTWIDTH] IN BLOOD BY AUTOMATED COUNT: 14.4 % (ref 11.5–14.5)
GLUCOSE SERPL-MCNC: 99 MG/DL (ref 65–100)
HCT VFR BLD AUTO: 39.2 % (ref 36.6–50.3)
HGB BLD-MCNC: 12.8 G/DL (ref 12.1–17)
MAGNESIUM SERPL-MCNC: 1.8 MG/DL (ref 1.6–2.4)
MCH RBC QN AUTO: 26.9 PG (ref 26–34)
MCHC RBC AUTO-ENTMCNC: 32.7 G/DL (ref 30–36.5)
MCV RBC AUTO: 82.5 FL (ref 80–99)
NRBC # BLD: 0 K/UL (ref 0–0.01)
NRBC BLD-RTO: 0 PER 100 WBC
PHOSPHATE SERPL-MCNC: 3.3 MG/DL (ref 2.6–4.7)
PLATELET # BLD AUTO: 210 K/UL (ref 150–400)
PMV BLD AUTO: 11.6 FL (ref 8.9–12.9)
POTASSIUM SERPL-SCNC: 4 MMOL/L (ref 3.5–5.1)
RBC # BLD AUTO: 4.75 M/UL (ref 4.1–5.7)
SODIUM SERPL-SCNC: 136 MMOL/L (ref 136–145)
WBC # BLD AUTO: 10.5 K/UL (ref 4.1–11.1)

## 2019-02-27 PROCEDURE — A9500 TC99M SESTAMIBI: HCPCS

## 2019-02-27 PROCEDURE — 74011250637 HC RX REV CODE- 250/637: Performed by: INTERNAL MEDICINE

## 2019-02-27 PROCEDURE — 74011000258 HC RX REV CODE- 258: Performed by: INTERNAL MEDICINE

## 2019-02-27 PROCEDURE — 74011250636 HC RX REV CODE- 250/636: Performed by: NURSE PRACTITIONER

## 2019-02-27 PROCEDURE — 84100 ASSAY OF PHOSPHORUS: CPT

## 2019-02-27 PROCEDURE — 74011250637 HC RX REV CODE- 250/637: Performed by: NURSE PRACTITIONER

## 2019-02-27 PROCEDURE — 85027 COMPLETE CBC AUTOMATED: CPT

## 2019-02-27 PROCEDURE — 80048 BASIC METABOLIC PNL TOTAL CA: CPT

## 2019-02-27 PROCEDURE — 74011000250 HC RX REV CODE- 250: Performed by: INTERNAL MEDICINE

## 2019-02-27 PROCEDURE — 36415 COLL VENOUS BLD VENIPUNCTURE: CPT

## 2019-02-27 PROCEDURE — 65660000000 HC RM CCU STEPDOWN

## 2019-02-27 PROCEDURE — 83735 ASSAY OF MAGNESIUM: CPT

## 2019-02-27 PROCEDURE — 78452 HT MUSCLE IMAGE SPECT MULT: CPT

## 2019-02-27 RX ORDER — CARVEDILOL 12.5 MG/1
25 TABLET ORAL 2 TIMES DAILY WITH MEALS
Status: DISCONTINUED | OUTPATIENT
Start: 2019-02-27 | End: 2019-02-28 | Stop reason: HOSPADM

## 2019-02-27 RX ORDER — HYDRALAZINE HYDROCHLORIDE 20 MG/ML
20 INJECTION INTRAMUSCULAR; INTRAVENOUS ONCE
Status: COMPLETED | OUTPATIENT
Start: 2019-02-27 | End: 2019-02-27

## 2019-02-27 RX ORDER — CARVEDILOL 12.5 MG/1
12.5 TABLET ORAL 2 TIMES DAILY WITH MEALS
Status: DISCONTINUED | OUTPATIENT
Start: 2019-02-27 | End: 2019-02-27

## 2019-02-27 RX ADMIN — APIXABAN 5 MG: 5 TABLET, FILM COATED ORAL at 17:52

## 2019-02-27 RX ADMIN — NITROGLYCERIN 1 INCH: 20 OINTMENT TOPICAL at 08:51

## 2019-02-27 RX ADMIN — Medication 10 ML: at 08:53

## 2019-02-27 RX ADMIN — SODIUM CHLORIDE 5 MG/HR: 900 INJECTION, SOLUTION INTRAVENOUS at 05:59

## 2019-02-27 RX ADMIN — DOCUSATE SODIUM 100 MG: 100 CAPSULE, LIQUID FILLED ORAL at 08:48

## 2019-02-27 RX ADMIN — APIXABAN 5 MG: 5 TABLET, FILM COATED ORAL at 08:48

## 2019-02-27 RX ADMIN — HYDRALAZINE HYDROCHLORIDE 20 MG: 20 INJECTION INTRAMUSCULAR; INTRAVENOUS at 08:51

## 2019-02-27 RX ADMIN — NITROGLYCERIN 1 INCH: 20 OINTMENT TOPICAL at 17:52

## 2019-02-27 RX ADMIN — AMLODIPINE BESYLATE 10 MG: 5 TABLET ORAL at 08:48

## 2019-02-27 RX ADMIN — Medication 40 ML: at 03:30

## 2019-02-27 RX ADMIN — LISINOPRIL 20 MG: 20 TABLET ORAL at 08:48

## 2019-02-27 RX ADMIN — DOCUSATE SODIUM 100 MG: 100 CAPSULE, LIQUID FILLED ORAL at 17:52

## 2019-02-27 RX ADMIN — ASPIRIN 81 MG: 81 TABLET ORAL at 09:45

## 2019-02-27 RX ADMIN — CARVEDILOL 25 MG: 12.5 TABLET, FILM COATED ORAL at 17:52

## 2019-02-27 RX ADMIN — Medication 10 ML: at 05:59

## 2019-02-27 RX ADMIN — Medication 10 ML: at 22:00

## 2019-02-27 NOTE — PROGRESS NOTES
Problem: Falls - Risk of 
Goal: *Absence of Falls Document [place] Fall Risk and appropriate interventions in the flowsheet. Outcome: Progressing Towards Goal 
Fall Risk Interventions: 
  
 
  
 
Medication Interventions: Patient to call before getting OOB Elimination Interventions: Call light in reach

## 2019-02-27 NOTE — PROGRESS NOTES
I met with pt.'s wife and gave her the main number to the [facility] [phone]. She is calling to sort pt veterans affairs insurance out as this is the first she or pt knew about it. Wife states that pt has not been truthful about his medical status because he does not want to be in the hospital. 
Per wife,pt was \"recently incarcerated for one month for doing something stupid\". \"When he was in senior care,his blood pressure was very high \" and Gifty Dalton has been telling the doctors he has never had high blood pressure before. \" 
I gave pt.'s wife a coupon for one free month of eliquis to take with her to a regulatr pharmacy if the [facility] assist her. I have requested for pt.'s wife to please take care of the eliquis today as pt will likely be discharged home tomorrow.  
 
Donn Pop

## 2019-02-27 NOTE — PROGRESS NOTES
1905 Received report. 2343 /82 , Cardene drip stopped. 0135 /94 , cardene drip restarted at 5 mg/hr. 0300 Patient resting , no c/o discomforts. 0704 Bedside shift change report given to [address] [place] [address] . Report included the following information SBAR, Kardex, ED Summary, Intake/Output, MAR, Accordion, Recent Results, Med Rec Status and Cardiac Rhythm at [facility].

## 2019-02-27 NOTE — PROGRESS NOTES
Before IDR,I met with pt.and his wife to insure pt.'s wife had filled pt.'s eliqis @ the [address] as pt has veterans affairs insurance . Wife was very surprised as pt had not informed her regarding the eliquis script given to pt yesterday for his wife to check into to insure affordability. Wife stated that pt is not on any home medications and that pt has never had any medications filled. Pt had told me yesterday ,he had \"all of his medications filled there. \" 
Wife reported that pt has nwever used the [place] for \"anything\". I asked pt and his wife if pt had any form of insurance other than [facility] and neither were able to tell me. What wife needs to do is to take the script today  to the [place] pharmacy as he is showing he has veterans affairs insurance to see he can have the eliquis filled there or take the coupon for one month free to the pharmacy to see if she can utilize the coupon.  
 
Isabel Ren

## 2019-02-27 NOTE — PROGRESS NOTES
Cardiology Progress Note       NAME:  Agustina Bassett :   1965 MRN:   [id number] Assessment/Plan: 1. A flutter CVR: cont coreg,  ECHO LVEF 60%. OK for stress this am. Weaned cardene off One time hydralazine dose given. Needs [address] but is self pay. Will have CM look in to [employer] pt assistance program.  Consider OP EP eval for a flutter ablation. 2. Syncope: no further episodes 3. HTN: uptitrate  BB (hold today for stress test), Norvasc, weaned cardene 4. Renal insufficiency 5. Leucocytosis: UC neg, WBCs normalized 6. Old CVA on CT head: neurology following  
 
 
19 ECHO ADULT COMPLETE 2019 Narrative · Estimated left ventricular ejection fraction is 56 - 60%. Left  
ventricular moderate concentric hypertrophy. Normal left ventricular wall  
motion, no regional wall motion abnormality noted. Signed by: Magi Parrish MD  
 
Pt personally seen and examined. Chart reviewed. Agree with advanced NP's history, exam and  A/P with changes/additons. Stress test today Discussed with patient/nursing Ross Horton MD, [facility] Subjective:  
49 yo hx of HTN, admitted for aflutter / syncope.  The patient was apparently sitting at home  when he \"passed out\" for several minutes. Pt does not remember the episode.  His mother found him and called EMS. No CP/dyspnea/swelling LE. In ED - EKG - aflutter with CVR. Trop WNL; WBC 14.5, Cr 1.4 No prior hx of CAD/arrhythmia Drug screen + THC 
 
CT Head - lacunar infarcts 
  
 
Cardiac ROS: Patient denies any exertional chest pain, dyspnea, palpitations, syncope, orthopnea, edema or paroxysmal nocturnal dyspnea. Review of Systems: No nausea, indigestion, vomiting, pain, cough, sputum. No bleeding. NPO. Objective:  
 
Visit Vitals BP (!) 137/93 Pulse (!) 111 Temp 97.5 °F (36.4 °C) Resp 19 Ht 6' 2\" (1.88 m) Wt 275 lb (124.7 kg) SpO2 100% BMI 35.31 kg/m² O2 Device: Room air Temp (24hrs), Av.9 °F (36.6 °C), Min:97.5 °F (36.4 °C), Max:98.4 °F (36.9 °C) No intake/output data recorded.  190 -  0700 In: 5498.7 [P.O.:960; I.V.:4538.7] Out: Hazen Hamman [[id number]] TELE: a flutter General: AAOx3 cooperative, no acute distress. HEENT: Atraumatic. Pink and moist.  Anicteric sclerae. Neck : Supple, no thyromegaly. Lungs: CTA bilaterally. No wheezing/rhonchi/rales. Heart: irregular rhythm, no murmur, no rubs, no gallops. No JVD. Abdomen: Soft, non-distended, non-tender. + Bowel sounds. Extremities: No edema, no clubbing, no cyanosis. Neurologic: Grossly intact. Alert and oriented X 3. No acute neurological distress. Psych: Fair insight. Not anxious or agitated. Care Plan discussed with: 
  Comments Patient x Family RN x Care Manager x Consultant:  x intensivist   
 
 
Data Review: No lab exists for component: ITNL Recent Labs  
  19  CKMB <1.0  
TROIQ <0.05 Recent Labs  
  19 
0334 19 
0355 19 
0256 19  134* 138 138  
K 4.0 4.1 3.8 4.3  102 102 103 CO2 20* 19* 21 27 BUN 11 9 15 16 CREA 1.08 0.98 1.41* 1.68* GLU 99 101* 127* 113* PHOS 3.3 3.0 3.8  --   
MG 1.8 2.0 2.3 1.9 ALB  --  3.6 3.4* 3.3* WBC 10.5 10.6 14.5* 8.9 HGB 12.8 13.6 12.8 12.5 HCT 39.2 41.9 39.0 38.2  221 223 199 Recent Labs  
  19 
025 INR 1.1 PTP 11.2* APTT 32.5* Medications reviewed Current Facility-Administered Medications Medication Dose Route Frequency  lisinopril (PRINIVIL, ZESTRIL) tablet 20 mg  20 mg Oral DAILY  apixaban (ELIQUIS) tablet 5 mg  5 mg Oral BID  
 0.9% sodium chloride infusion  25 mL/hr IntraVENous CONTINUOUS  
 sodium chloride (NS) flush 5-40 mL  5-40 mL IntraVENous Q8H  
 sodium chloride (NS) flush 5-40 mL  5-40 mL IntraVENous PRN  
  acetaminophen (TYLENOL) tablet 650 mg  650 mg Oral Q4H PRN  
 HYDROcodone-acetaminophen (NORCO) 5-325 mg per tablet 1 Tab  1 Tab Oral Q4H PRN  
 HYDROmorphone (PF) (DILAUDID) injection 0.5 mg  0.5 mg IntraVENous Q4H PRN  
 naloxone (NARCAN) injection 0.4 mg  0.4 mg IntraVENous PRN  
 diphenhydrAMINE (BENADRYL) injection 12.5 mg  12.5 mg IntraVENous Q4H PRN  
 ondansetron (ZOFRAN) injection 4 mg  4 mg IntraVENous Q6H PRN  
 docusate sodium (COLACE) capsule 100 mg  100 mg Oral BID  hydrALAZINE (APRESOLINE) 20 mg/mL injection 10 mg  10 mg IntraVENous Q6H PRN  
 carvedilol (COREG) tablet 3.125 mg  3.125 mg Oral BID WITH MEALS  nitroglycerin (NITROBID) 2 % ointment 1 Inch  1 Inch Topical BID  amLODIPine (NORVASC) tablet 10 mg  10 mg Oral DAILY  niCARdipine (CARDENE) 25 mg in 0.9% sodium chloride 250 mL infusion  0-15 mg/hr IntraVENous TITRATE  nicotine (NICODERM CQ) 21 mg/24 hr patch 1 Patch  1 Patch TransDERmal DAILY  sodium chloride (NS) flush 5-40 mL  5-40 mL IntraVENous Q8H  
 sodium chloride (NS) flush 5-40 mL  5-40 mL IntraVENous PRN  
 aspirin delayed-release tablet 81 mg  81 mg Oral DAILY  labetalol (NORMODYNE;TRANDATE) 20 mg/4 mL (5 mg/mL) injection 20 mg  20 mg IntraVENous Q4H PRN JERRY Mcneill Student

## 2019-02-27 NOTE — PROGRESS NOTES
0715 - Bedside and Verbal shift change report given to osorio mckoy (oncoming nurse) by jessica blackburn (offgoing nurse). Report included the following information SBAR, Kardex, Intake/Output, MAR, Recent Results and Cardiac Rhythm aflutter. 1900 - Bedside and Verbal shift change report given to richard (oncoming nurse) by Isabelle Samuels (offgoing nurse). Report included the following information SBAR, Kardex, Intake/Output, MAR, Recent Results and Cardiac Rhythm aflutter.

## 2019-02-27 NOTE — PROGRESS NOTES
[facility] Medical Progress Note NAME: Ginna Neely :  1965 MRM:  [id number] Date/Time: 2019  2:49 PM 
 
  
Assessment and Plan:  
 
Uncontrolled hypertension - POA, and initially required ICU for Cardene gtt; now weaned off. Better on Norvasc, Coreg, lisinopril, nitropaste 
  
New onset atrial flutter - Not yet rate controlled. Started on Eliquis. Increased Coreg. Stress test underway. 2nd half tomorrow. DC home if normal, likely cath if abnormal 
  
Syncope and collapse - Prior to admit, unclear etiology. ?tachyarrhthmia. MRI brain showed old lacunar infarct. Monitor on tele. BP control. TTE. Appreciate neuro evaluation, EEG negative for seizures 
  
RAJ (acute kidney injury) - POA, now resolved. Monitor BMP. Renal US unremarkable.  
  
Obesity - likely has CANDICE. Needs outpatient polysomnography. Advised weight loss Subjective: Chief Complaint:  Feels fine, no complaints ROS: 
(bold if positive, if negative) Tolerating PT  Tolerating Diet Objective:  
 
Last 24hrs VS reviewed since prior progress note. Most recent are: 
 
Visit Vitals BP (!) 133/92 (BP 1 Location: Right arm, BP Patient Position: At rest) Pulse 97 Temp 98 °F (36.7 °C) Resp 20 Ht 6' 2\" (1.88 m) Wt 124.7 kg (275 lb) SpO2 99% BMI 35.31 kg/m² SpO2 Readings from Last 6 Encounters:  
19 99% Intake/Output Summary (Last 24 hours) at 2019 1449 Last data filed at 2019 1200 Gross per 24 hour Intake 1758.75 ml Output 1980 ml Net -221.25 ml Physical Exam: 
 
Gen:  Obese, in no acute distress HEENT:  Pink conjunctivae, PERRL, hearing intact to voice, moist mucous membranes Neck:  Supple, without masses, thyroid non-tender Resp:  No accessory muscle use, clear breath sounds without wheezes rales or rhonchi 
Card:  No murmurs, irregular tachycardic S1, S2 without thrills, bruits or peripheral edema Abd:  Soft, non-tender, non-distended, normoactive bowel sounds are present, no mass Lymph:  No cervical or inguinal adenopathy Musc:  No cyanosis or clubbing Skin:  No rashes or ulcers, skin turgor is good Neuro:  Cranial nerves are grossly intact, no focal motor weakness, follows commands Psych:  Poor insight, oriented to person, place and time, alert Telemetry reviewed:   flutter 
__________________________________________________________________ Medications Reviewed: (see below) Medications:  
 
Current Facility-Administered Medications Medication Dose Route Frequency  technetium sestamibi (CARDIOLITE) injection 32 millicurie  32 millicurie IntraVENous RAD ONCE  carvedilol (COREG) tablet 25 mg  25 mg Oral BID WITH MEALS  lisinopril (PRINIVIL, ZESTRIL) tablet 20 mg  20 mg Oral DAILY  apixaban (ELIQUIS) tablet 5 mg  5 mg Oral BID  
 0.9% sodium chloride infusion  25 mL/hr IntraVENous CONTINUOUS  
 sodium chloride (NS) flush 5-40 mL  5-40 mL IntraVENous Q8H  
 sodium chloride (NS) flush 5-40 mL  5-40 mL IntraVENous PRN  
 acetaminophen (TYLENOL) tablet 650 mg  650 mg Oral Q4H PRN  
 HYDROcodone-acetaminophen (NORCO) 5-325 mg per tablet 1 Tab  1 Tab Oral Q4H PRN  
 HYDROmorphone (PF) (DILAUDID) injection 0.5 mg  0.5 mg IntraVENous Q4H PRN  
 naloxone (NARCAN) injection 0.4 mg  0.4 mg IntraVENous PRN  
 diphenhydrAMINE (BENADRYL) injection 12.5 mg  12.5 mg IntraVENous Q4H PRN  
 ondansetron (ZOFRAN) injection 4 mg  4 mg IntraVENous Q6H PRN  
 docusate sodium (COLACE) capsule 100 mg  100 mg Oral BID  hydrALAZINE (APRESOLINE) 20 mg/mL injection 10 mg  10 mg IntraVENous Q6H PRN  
 nitroglycerin (NITROBID) 2 % ointment 1 Inch  1 Inch Topical BID  amLODIPine (NORVASC) tablet 10 mg  10 mg Oral DAILY  niCARdipine (CARDENE) 25 mg in 0.9% sodium chloride 250 mL infusion  0-15 mg/hr IntraVENous TITRATE  nicotine (NICODERM CQ) 21 mg/24 hr patch 1 Patch  1 Patch TransDERmal DAILY  sodium chloride (NS) flush 5-40 mL  5-40 mL IntraVENous Q8H  
 sodium chloride (NS) flush 5-40 mL  5-40 mL IntraVENous PRN  
 aspirin delayed-release tablet 81 mg  81 mg Oral DAILY  labetalol (NORMODYNE;TRANDATE) 20 mg/4 mL (5 mg/mL) injection 20 mg  20 mg IntraVENous Q4H PRN Lab Data Reviewed: (see below) Lab Review:  
 
Recent Labs  
  02/27/19 0334 02/26/19 0355 02/25/19 0256 WBC 10.5 10.6 14.5* HGB 12.8 13.6 12.8 HCT 39.2 41.9 39.0  221 223 Recent Labs  
  02/27/19 0334 02/26/19 0355 02/25/19 0256 02/24/19 2019  134* 138 138  
K 4.0 4.1 3.8 4.3  102 102 103 CO2 20* 19* 21 27 GLU 99 101* 127* 113* BUN 11 9 15 16 CREA 1.08 0.98 1.41* 1.68* CA 8.4* 8.5  8.8 8.5 8.6 MG 1.8 2.0 2.3 1.9 PHOS 3.3 3.0 3.8  --   
ALB  --  3.6 3.4* 3.3* TBILI  --  0.5 0.2 0.2 SGOT  --  28 24 21 ALT  --  40 43 42 INR  --   --  1.1  -- No results found for: Bogdan Expose No results for input(s): PH, PCO2, PO2, HCO3, FIO2 in the last 72 hours. Recent Labs  
  02/25/19 0256 INR 1.1 All Micro Results Procedure Component Value Units Date/Time Kelby Watson [[id number]] Collected:  02/24/19 2153 Order Status:  Completed Specimen:  Urine from Clean catch Updated:  02/26/19 1331 Special Requests: ADD ON TO UA ALREADY SENT [place] Count <1,000 CFU/ML Culture result: NO GROWTH 1 DAY URINE CULTURE HOLD SAMPLE [[id number]] Collected:  02/24/19 2155 Order Status:  Completed Specimen:  Urine from Serum Updated:  02/24/19 2202 Urine culture hold URINE ON HOLD IN MICROBIOLOGY DEPT FOR 3 DAYS. IF UNPRESERVED URINE IS SUBMITTED, IT CANNOT BE USED FOR ADDITIONAL TESTING AFTER 24 HRS, RECOLLECTION WILL BE REQUIRED. I have reviewed notes of prior 24hr. Other pertinent lab: none Total time spent with patient: 45 Minutes Care Plan discussed with: Patient, Family, Care Manager, Nursing Staff, Consultant/Specialist and >50% of time spent in counseling and coordination of care Discussed:  Care Plan and D/C Planning Prophylaxis:  Lovenox and H2B/PPI Disposition:  Home w/Family 
        
___________________________________________________ Attending Physician: Ellen Victor MD

## 2019-02-27 NOTE — PROGRESS NOTES
[facility] KIDNEY Renal Daily Progress Note:  
 
Admission Date: 2019 Subjective: chart reviewed and patient examined. Remains in A-flutter,  Nicardipine drip being weaned. No chest pain, SOB or N/V. No abd pain. No lower ext edema. EEG negative Review of Systems Pertinent items are noted in HPI. Objective:  
 
Visit Vitals /85 Pulse 76 Temp 97.9 °F (36.6 °C) Resp 21 Ht 6' 2\" (1.88 m) Wt 124.7 kg (275 lb) SpO2 96% BMI 35.31 kg/m² Temp (24hrs), Av.9 °F (36.6 °C), Min:97.5 °F (36.4 °C), Max:98.4 °F (36.9 °C) Intake/Output Summary (Last 24 hours) at 2019 8659 Last data filed at 2019 0700 Gross per 24 hour Intake 2443.33 ml Output 1550 ml Net 893.33 ml  
 
Current Facility-Administered Medications Medication Dose Route Frequency  lisinopril (PRINIVIL, ZESTRIL) tablet 20 mg  20 mg Oral DAILY  apixaban (ELIQUIS) tablet 5 mg  5 mg Oral BID  
 0.9% sodium chloride infusion  25 mL/hr IntraVENous CONTINUOUS  
 sodium chloride (NS) flush 5-40 mL  5-40 mL IntraVENous Q8H  
 sodium chloride (NS) flush 5-40 mL  5-40 mL IntraVENous PRN  
 acetaminophen (TYLENOL) tablet 650 mg  650 mg Oral Q4H PRN  
 HYDROcodone-acetaminophen (NORCO) 5-325 mg per tablet 1 Tab  1 Tab Oral Q4H PRN  
 HYDROmorphone (PF) (DILAUDID) injection 0.5 mg  0.5 mg IntraVENous Q4H PRN  
 naloxone (NARCAN) injection 0.4 mg  0.4 mg IntraVENous PRN  
 diphenhydrAMINE (BENADRYL) injection 12.5 mg  12.5 mg IntraVENous Q4H PRN  
 ondansetron (ZOFRAN) injection 4 mg  4 mg IntraVENous Q6H PRN  
 docusate sodium (COLACE) capsule 100 mg  100 mg Oral BID  hydrALAZINE (APRESOLINE) 20 mg/mL injection 10 mg  10 mg IntraVENous Q6H PRN  
 carvedilol (COREG) tablet 3.125 mg  3.125 mg Oral BID WITH MEALS  nitroglycerin (NITROBID) 2 % ointment 1 Inch  1 Inch Topical BID  amLODIPine (NORVASC) tablet 10 mg  10 mg Oral DAILY  niCARdipine (CARDENE) 25 mg in 0.9% sodium chloride 250 mL infusion  0-15 mg/hr IntraVENous TITRATE  nicotine (NICODERM CQ) 21 mg/24 hr patch 1 Patch  1 Patch TransDERmal DAILY  sodium chloride (NS) flush 5-40 mL  5-40 mL IntraVENous Q8H  
 sodium chloride (NS) flush 5-40 mL  5-40 mL IntraVENous PRN  
 aspirin delayed-release tablet 81 mg  81 mg Oral DAILY  labetalol (NORMODYNE;TRANDATE) 20 mg/4 mL (5 mg/mL) injection 20 mg  20 mg IntraVENous Q4H PRN Physical Exam:General appearance: alert, cooperative, no distress, appears stated age Neck: supple, symmetrical, trachea midline, no adenopathy, thyroid: not enlarged, symmetric, no tenderness/mass/nodules and no JVD Lungs: clear to auscultation bilaterally Heart: irregularly irregular rhythm, no S3 or S4, no rub Abdomen: soft, non-tender. Bowel sounds normal. No masses,  no organomegaly Extremities: no edema Neurologic: Grossly normal 
 
Data Review:  
 
LABS: 
Recent Labs  
  02/27/19 0334 02/26/19 
0355 02/25/19 0256 02/24/19 2019  134* 138 138  
K 4.0 4.1 3.8 4.3  102 102 103 CO2 20* 19* 21 27 BUN 11 9 15 16 CREA 1.08 0.98 1.41* 1.68* CA 8.4* 8.5  8.8 8.5 8.6 ALB  --  3.6 3.4* 3.3*  
PHOS 3.3 3.0 3.8  --   
MG 1.8 2.0 2.3 1.9 Recent Labs  
  02/27/19 
0334 02/26/19 
0355 02/25/19 
0256 WBC 10.5 10.6 14.5* HGB 12.8 13.6 12.8 HCT 39.2 41.9 39.0  221 223 Recent Labs  
  02/26/19 
8073 02/25/19 
0023 DEVAN  --  100 CREAU 128.21 110.30  
UA neg protein by dipstick Renal US 2-25-19 FINDINGS: The right kidney measures 10.6 cm in length. The left kidney measures 10.0 cm in length. Both kidneys demonstrate normal cortical echogenicity. No 
renal calculus is seen. There is no hydronephrosis. There is a 1.3 cm cyst in 
the interpolar region of the left kidney. The abdominal aorta is normal in 
caliber. The common iliac artery origins are normal. The urinary bladder demonstrates no filling defect. The inferior vena cava is patent. 
  
IMPRESSION IMPRESSION: Small left renal cyst. Otherwise normal renal ultrasound. MRI brain 2-25-19 FINDINGS: There is mild diffuse cortical atrophy. There is no restricted 
diffusion to suggest acute infarct. The ventricular system is normal. There are 
extensive confluent and punctate bilateral white matter hyperintensities 
consistent with chronic, but accelerated microvascular ischemic changes. There 
are chronic bilateral basal ganglia lacunes. The cervicomedullary junction is 
normal and there is no tonsillar ectopia. Gradient images demonstrate a 7 mm x 2 
mm focus of susceptibility artifact without correlating finding on prior CT 
dated February 24, 2019, consistent with hemosiderin staining. There is no acute 
intracranial hemorrhage. The visualized vascular flow-voids of the skull base 
are normal. There is no intracranial mass lesion, mass effect or herniation. 
  
IMPRESSION IMPRESSION: No acute intracranial hemorrhage or infarct. CT head 2-24-19 FINDINGS: 
The ventricles and sulci are normal in size, shape and configuration and 
midline. There is a periventricular white matter hypodensity. Additional foci of 
discrete hypodensity are present in the left caudate nucleus, January of both 
internal capsules, and right thalamus. . There is no intracranial hemorrhage, 
extra-axial collection, mass, mass effect or midline shift. The basilar 
cisterns are open. No acute infarct is identified. The bone windows demonstrate 
no abnormalities. The visualized portions of the paranasal sinuses and mastoid 
air cells are clear. 
  
IMPRESSION IMPRESSION:  
  
White matter disease. Old lacunar infarcts. No acute intracranial hemorrhage 
identified. 
  
Assessment:  
Renal Specific Problems RAJ resolved Probably CKD 2 Mild hypoalbumin Hypertension Plan: Obtain/ Order: labs/cultures/radiology/procedures:  spot urine protein and creatinine ratio Therapeutic:   
Decrease IVF rate--consider d/c IVF 
 lisinopril added ( will take 2-3 days to see full effect of oral meds) Titrate off Nicardipine (defer to CV/Primary team) Emily Schmid MD   
[phone]

## 2019-02-27 NOTE — CONSULTS
[facility]     Name: Latanya Garcia MRN: [id number]   : 1965 Hospital: [address]   Date: 2019        Impression Plan   1. HTN  2. A flutter  3. Syncope  4. RAJ, improving  5. Emphsyema  6. Suspected CANDICE               · Norvasc, coreg, lisinopril   · Nicardipine drip off  · Cardiology consulted  · Stress test today  · Renal following  · Neurology following, EEG hegative  · Nicotine patch  · Tobacco cessation education  · Needs outpatient pulmonary follow-up with PFTs as well as sleep evaluation if amenable  · ASA  · Eliquis  · Cardiac diet after stress test today       Radiology  ( personally reviewed) CTA chest without PE, lungs clear, +emphysematous changes  CT head with white matter disease, old lacunar infarcts  MRI brain without acute intracranial hemorrhage or infarct  Carotid dopplers with 0-49% stenosis bilaterally  Renal US with small left renal cyst, otherwise normal   ABG No results for input(s): PHI, PO2I, PCO2I in the last 72 hours. Subjective     Nicardipine off. Hungry and frustrated that he has to leave, but he otherwise has not complaints. First half of stress test today. Review of Systems:  A comprehensive review of systems was negative except for that written in the HPI. Past Medical History:   Diagnosis Date    Hypertension     Palpitations       No past surgical history on file. Prior to Admission medications    Medication Sig Start Date End Date Taking? Authorizing Provider   apixaban (ELIQUIS) 5 mg tablet Take 1 Tab by mouth two (2) times a day.  19  Yes Laura Brock NP     Current Facility-Administered Medications   Medication Dose Route Frequency    carvedilol (COREG) tablet 12.5 mg  12.5 mg Oral BID WITH MEALS    technetium sestamibi (CARDIOLITE) injection 32 millicurie  32 millicurie IntraVENous RAD ONCE    lisinopril (PRINIVIL, ZESTRIL) tablet 20 mg  20 mg Oral DAILY    apixaban (ELIQUIS) tablet 5 mg  5 mg Oral BID    0.9% sodium chloride infusion  25 mL/hr IntraVENous CONTINUOUS    sodium chloride (NS) flush 5-40 mL  5-40 mL IntraVENous Q8H    docusate sodium (COLACE) capsule 100 mg  100 mg Oral BID    nitroglycerin (NITROBID) 2 % ointment 1 Inch  1 Inch Topical BID    amLODIPine (NORVASC) tablet 10 mg  10 mg Oral DAILY    niCARdipine (CARDENE) 25 mg in 0.9% sodium chloride 250 mL infusion  0-15 mg/hr IntraVENous TITRATE    nicotine (NICODERM CQ) 21 mg/24 hr patch 1 Patch  1 Patch TransDERmal DAILY    sodium chloride (NS) flush 5-40 mL  5-40 mL IntraVENous Q8H    aspirin delayed-release tablet 81 mg  81 mg Oral DAILY     No Known Allergies   Social History     Tobacco Use    Smoking status: Smoker, Current Status Unknown   Substance Use Topics    Alcohol use: No     Frequency: Never      Family History   Problem Relation Age of Onset    Hypertension Father     Hypertension Mother           Laboratory: I have personally reviewed the critical care flowsheet and labs.      Recent Labs     02/27/19  0334 02/26/19  0355 02/25/19 0256   WBC 10.5 10.6 14.5*   HGB 12.8 13.6 12.8   HCT 39.2 41.9 39.0    221 223     Recent Labs     02/27/19  0334 02/26/19  0355 02/25/19  0256 02/24/19 2019    134* 138 138   K 4.0 4.1 3.8 4.3    102 102 103   CO2 20* 19* 21 27   GLU 99 101* 127* 113*   BUN 11 9 15 16   CREA 1.08 0.98 1.41* 1.68*   CA 8.4* 8.5  8.8 8.5 8.6   MG 1.8 2.0 2.3 1.9   PHOS 3.3 3.0 3.8  --    ALB  --  3.6 3.4* 3.3*   SGOT  --  28 24 21   ALT  --  40 43 42   INR  --   --  1.1  --        Objective:     Mode Rate Tidal Volume Pressure FiO2 PEEP                    Vital Signs:     TMAX(24)      Intake/Output:   Last shift:         Last 3 shifts: 02/27 0701 - 02/27 1900  In: 138.8 [I.V.:138.8]  Out: 180 [Urine:180][id number]    Intake/Output Summary (Last 24 hours) at 2/27/2019 1045  Last data filed at 2/27/2019 1000  Gross per 24 hour   Intake 2407.08 ml   Output 1730 ml   Net 677.08 ml EXAM:   GENERAL: well developed and in no distress, HEENT:  PERRL, EOMI, no alar flaring or epistaxis, oral mucosa moist without cyanosis, NECK:  no jugular vein distention, no retractions, no thyromegaly or masses, LUNGS: CTAB, respirations non-labored, HEART:  Regular rate and rhythm with no MGR; no edema is present, ABDOMEN:  soft with no tenderness, bowel sounds present, EXTREMITIES:  warm with no cyanosis, SKIN:  no jaundice or ecchymosis and NEUROLOGIC:  alert and oriented, grossly non-focal    Cally Wiseman MD  [facility]

## 2019-02-27 NOTE — PROGRESS NOTES
Problem: Falls - Risk of 
Goal: *Absence of Falls Document Lico Connells Fall Risk and appropriate interventions in the flowsheet. Outcome: Progressing Towards Goal 
Fall Risk Interventions: 
  
 
  
 
Medication Interventions: Assess postural VS orthostatic hypotension, Evaluate medications/consider consulting pharmacy, Patient to call before getting OOB, Teach patient to arise slowly

## 2019-02-28 ENCOUNTER — APPOINTMENT (OUTPATIENT)
Dept: NUCLEAR MEDICINE | Age: 54
DRG: 309 | End: 2019-02-28
Attending: NURSE PRACTITIONER
Payer: OTHER GOVERNMENT

## 2019-02-28 VITALS
WEIGHT: 275 LBS | BODY MASS INDEX: 35.29 KG/M2 | HEIGHT: 74 IN | SYSTOLIC BLOOD PRESSURE: 134 MMHG | HEART RATE: 69 BPM | OXYGEN SATURATION: 98 % | RESPIRATION RATE: 16 BRPM | TEMPERATURE: 97.8 F | DIASTOLIC BLOOD PRESSURE: 83 MMHG

## 2019-02-28 LAB
ANION GAP SERPL CALC-SCNC: 12 MMOL/L (ref 5–15)
BUN SERPL-MCNC: 16 MG/DL (ref 6–20)
BUN/CREAT SERPL: 14 (ref 12–20)
CALCIUM SERPL-MCNC: 8.7 MG/DL (ref 8.5–10.1)
CHLORIDE SERPL-SCNC: 102 MMOL/L (ref 97–108)
CO2 SERPL-SCNC: 21 MMOL/L (ref 21–32)
CREAT SERPL-MCNC: 1.17 MG/DL (ref 0.7–1.3)
ERYTHROCYTE [DISTWIDTH] IN BLOOD BY AUTOMATED COUNT: 14.6 % (ref 11.5–14.5)
GLUCOSE SERPL-MCNC: 97 MG/DL (ref 65–100)
HCT VFR BLD AUTO: 39.2 % (ref 36.6–50.3)
HGB BLD-MCNC: 12.8 G/DL (ref 12.1–17)
MAGNESIUM SERPL-MCNC: 1.9 MG/DL (ref 1.6–2.4)
MCH RBC QN AUTO: 27.3 PG (ref 26–34)
MCHC RBC AUTO-ENTMCNC: 32.7 G/DL (ref 30–36.5)
MCV RBC AUTO: 83.6 FL (ref 80–99)
NRBC # BLD: 0 K/UL (ref 0–0.01)
NRBC BLD-RTO: 0 PER 100 WBC
PHOSPHATE SERPL-MCNC: 3.8 MG/DL (ref 2.6–4.7)
PLATELET # BLD AUTO: 207 K/UL (ref 150–400)
PMV BLD AUTO: 11.8 FL (ref 8.9–12.9)
POTASSIUM SERPL-SCNC: 4.3 MMOL/L (ref 3.5–5.1)
RBC # BLD AUTO: 4.69 M/UL (ref 4.1–5.7)
SODIUM SERPL-SCNC: 135 MMOL/L (ref 136–145)
STRESS ST DEPRESSION: 0 MM
STRESS ST ELEVATION: 0 MM
STRESS TARGET HR: 144 BPM
WBC # BLD AUTO: 10.6 K/UL (ref 4.1–11.1)

## 2019-02-28 PROCEDURE — 84100 ASSAY OF PHOSPHORUS: CPT

## 2019-02-28 PROCEDURE — 74011250637 HC RX REV CODE- 250/637: Performed by: INTERNAL MEDICINE

## 2019-02-28 PROCEDURE — 74011250636 HC RX REV CODE- 250/636: Performed by: SPECIALIST

## 2019-02-28 PROCEDURE — 83735 ASSAY OF MAGNESIUM: CPT

## 2019-02-28 PROCEDURE — 74011250637 HC RX REV CODE- 250/637: Performed by: NURSE PRACTITIONER

## 2019-02-28 PROCEDURE — 80048 BASIC METABOLIC PNL TOTAL CA: CPT

## 2019-02-28 PROCEDURE — 85027 COMPLETE CBC AUTOMATED: CPT

## 2019-02-28 PROCEDURE — 36415 COLL VENOUS BLD VENIPUNCTURE: CPT

## 2019-02-28 RX ORDER — CARVEDILOL 25 MG/1
25 TABLET ORAL 2 TIMES DAILY WITH MEALS
Qty: 60 TAB | Refills: 0 | Status: SHIPPED | OUTPATIENT
Start: 2019-02-28 | End: 2019-03-29

## 2019-02-28 RX ORDER — AMLODIPINE BESYLATE 10 MG/1
10 TABLET ORAL DAILY
Qty: 30 TAB | Refills: 0 | Status: SHIPPED | OUTPATIENT
Start: 2019-03-01 | End: 2019-03-31

## 2019-02-28 RX ORDER — LISINOPRIL 20 MG/1
20 TABLET ORAL DAILY
Qty: 30 TAB | Refills: 0 | Status: ON HOLD | OUTPATIENT
Start: 2019-03-01 | End: 2019-03-29 | Stop reason: SDUPTHER

## 2019-02-28 RX ORDER — ASPIRIN 81 MG/1
81 TABLET ORAL DAILY
Qty: 30 TAB | Refills: 0 | Status: ON HOLD | OUTPATIENT
Start: 2019-03-01 | End: 2019-03-29

## 2019-02-28 RX ADMIN — Medication 10 ML: at 05:21

## 2019-02-28 RX ADMIN — Medication 20 ML: at 14:00

## 2019-02-28 RX ADMIN — AMLODIPINE BESYLATE 10 MG: 5 TABLET ORAL at 08:14

## 2019-02-28 RX ADMIN — REGADENOSON 0.4 MG: 0.08 INJECTION, SOLUTION INTRAVENOUS at 09:18

## 2019-02-28 RX ADMIN — ASPIRIN 81 MG: 81 TABLET ORAL at 08:14

## 2019-02-28 RX ADMIN — LISINOPRIL 20 MG: 20 TABLET ORAL at 08:14

## 2019-02-28 RX ADMIN — CARVEDILOL 25 MG: 12.5 TABLET, FILM COATED ORAL at 08:14

## 2019-02-28 RX ADMIN — APIXABAN 5 MG: 5 TABLET, FILM COATED ORAL at 08:14

## 2019-02-28 RX ADMIN — DOCUSATE SODIUM 100 MG: 100 CAPSULE, LIQUID FILLED ORAL at 08:14

## 2019-02-28 NOTE — DISCHARGE INSTRUCTIONS
Patient Discharge Instructions    Leny Fink / [id number] : 1965    Admitted 2019 Discharged: 2019     Primary Diagnoses  Problem List as of 2019 Date Reviewed: 2019           Syncope and collapse   * (Principal) New onset atrial flutter (HCC)   RAJ (acute kidney injury) ([facility] [address]   Uncontrolled hypertension (Chronic)          Take Home Medications     · It is important that you take the medication exactly as they are prescribed. · Keep your medication in the bottles provided by the pharmacist and keep a list of the medication names, dosages, and times to be taken in your wallet. · Do not take other medications without consulting your doctor. What to do at Home    Recommended diet: Cardiac Diet and NO caffeine    Recommended activity: Activity as tolerated    If you experience worse symptoms, please follow up with cardiology. Follow-up with your PCP in a few weeks    Apixaban (By mouth)   Apixaban (a-PIX-a-ban)  Treats and prevents blood clots. This medicine is a blood thinner. Brand Name(s): Eliquis   There may be other brand names for this medicine. When This Medicine Should Not Be Used: This medicine is not right for everyone. Do not use it if you had an allergic reaction to apixaban or you have active bleeding. How to Use This Medicine:   Tablet  · Your doctor will tell you how much medicine to use. Do not use more than directed. · If you are not able to swallow the tablets whole, they may be crushed and mixed in water, 5% dextrose in water (D5W), apple juice, or applesauce. The crushed tablets may be mixed with 60 mL of water or D5W dose and given through a nasogastric tube (NGT). · This medicine should come with a Medication Guide. Ask your pharmacist for a copy if you do not have one. · Missed dose: Take a dose as soon as you remember. If it is almost time for your next dose, wait until then and take a regular dose.  Do not take extra medicine to make up for a missed dose. · Store the medicine in a closed container at room temperature, away from heat, moisture, and direct light. Drugs and Foods to Avoid:   Ask your doctor or pharmacist before using any other medicine, including over-the-counter medicines, vitamins, and herbal products. · Some medicines can affect how apixaban works. Tell your doctor if you are using any of the following:   ¨ Carbamazepine, clarithromycin, itraconazole, ketoconazole, phenytoin, rifampin, ritonavir, Whitney's wort  ¨ Blood thinner (including clopidogrel, heparin, prasugrel, warfarin)  ¨ Medicine to treat depression  ¨ NSAID pain or arthritis medicine (including aspirin, celecoxib, diclofenac, ibuprofen, naproxen)  Warnings While Using This Medicine:   · Tell your doctor if you are pregnant or breastfeeding, or if you have kidney disease, liver disease, bleeding problems, or an artificial heart valve. · Do not stop using this medicine suddenly without asking your doctor. You might have a higher risk of stroke for a short time after you stop using this medicine. · This medicine increases your risk for bleeding that can become serious if not controlled. You may also bruise easily, and it may take longer than usual for bleeding to stop. · This medicine may increase your risk for blood clots in your spine or back if you undergo an epidural or spinal puncture. This could lead to paralysis. Tell your doctor if you ever had spine problems or back surgery. · Tell any doctor or dentist who treats you that you are using this medicine. With your doctor's supervision, you may need to stop using this medicine several days before you have surgery or medical tests. · Your doctor will do lab tests at regular visits to check on the effects of this medicine. Keep all appointments. · Keep all medicine out of the reach of children. Never share your medicine with anyone.   Possible Side Effects While Using This Medicine:   Call your doctor right away if you notice any of these side effects:  · Allergic reaction: Itching or hives, swelling in your face or hands, swelling or tingling in your mouth or throat, chest tightness, trouble breathing  · Change in how much or how often you urinate, red or pink urine  · Chest pain, trouble breathing  · Coughing up blood, vomiting blood or material that looks like coffee grounds  · Numbness, tingling, or muscle weakness in your legs or feet  · Red or black, tarry stools  · Unusual bleeding, bruising, or weakness  If you notice other side effects that you think are caused by this medicine, tell your doctor. Call your doctor for medical advice about side effects. You may report side effects to FDA at 0-190-XWB-7559  © 2017 [employer] Information is for End User's use only and may not be sold, redistributed or otherwise used for commercial purposes. The above information is an  only. It is not intended as medical advice for individual conditions or treatments. Talk to your doctor, nurse or pharmacist before following any medical regimen to see if it is safe and effective for you. Atrial Flutter: Care Instructions  Your Care Instructions    Atrial flutter is a type of heartbeat problem (arrhythmia) that usually causes a fast heart rate. In atrial flutter, a problem with the heart's electrical system causes the two upper parts of the heart (the right atrium and the left atrium) to flutter, or beat very fast. Atrial flutter might be diagnosed using an an electrocardiogram (EKG). An EKG translates the heart's electrical activity into line tracings on paper. Treating atrial flutter is important for several reasons. The change in heartbeat can cause blood clots. The clots can travel from your heart to your brain and cause a stroke. A fast heartbeat can make you feel lightheaded, dizzy, and weak. And over time, it can also increase your risk for heart failure.   Atrial flutter is often the result of another heart condition, such as coronary artery disease or some other heart rhythm problems. Making changes to improve your heart health will help you stay healthy and active. Your doctor may prescribe medicines to help slow down your heartbeat. You may also take medicine to help prevent a stroke. In some cases, a procedure called catheter ablation is done to stop atrial flutter. Follow-up care is a key part of your treatment and safety. Be sure to make and go to all appointments, and call your doctor if you are having problems. It's also a good idea to know your test results and keep a list of the medicines you take. How can you care for yourself at home? Medicines    · Take your medicines exactly as prescribed. Call your doctor if you think you are having a problem with your medicine. You will get more details on the specific medicines your doctor prescribes.     · If your doctor has given you a blood thinner to prevent a stroke, be sure you get instructions about how to take your medicine safely. Blood thinners can cause serious bleeding problems.     · Do not take any vitamins, over-the-counter drugs, or herbal products without talking to your doctor first.    Lifestyle changes    · Do not smoke. Smoking can increase your chance of a stroke and heart attack. If you need help quitting, talk to your doctor about stop-smoking programs and medicines. These can increase your chances of quitting for good.     · Eat a heart-healthy diet.     · Stay at a healthy weight. Lose weight if you need to.     · Limit alcohol to 2 drinks a day for men and 1 drink a day for women. Too much alcohol can cause health problems.     · Avoid colds and flu. Get a pneumococcal vaccine shot. If you have had one before, ask your doctor whether you need another dose. Get a flu shot every year. If you must be around people with colds or flu, wash your hands often.    Activity    · Talk to your doctor about what type and level of exercise is safe for you. Start light exercise if your doctor says it is okay. Walking is a good choice. Try for at least 30 minutes on most days of the week. You also may want to swim, bike, or do other activities.     · When you exercise, watch for signs that your heart is working too hard. You are pushing too hard if you can't talk while you exercise. If you become short of breath or dizzy or have chest pain, sit down and rest right away. When should you call for help? Call 911 anytime you think you may need emergency care. For example, call if:    · You have symptoms of a stroke. These may include:  ? Sudden numbness, tingling, weakness, or loss of movement in your face, arm, or leg, especially on only one side of your body. ? Sudden vision changes. ? Sudden trouble speaking. ? Sudden confusion or trouble understanding simple statements. ? Sudden problems with walking or balance. ? A sudden, severe headache that is different from past headaches.     · You passed out (lost consciousness).    Call your doctor now or seek immediate medical care if:    · You have new or increased shortness of breath.     · You feel dizzy or lightheaded, or you feel like you may faint.     · Your heart rate becomes irregular.     · You can feel your heart flutter in your chest or skip heartbeats. Tell your doctor if these symptoms are new or worse.    Watch closely for changes in your health, and be sure to contact your doctor if you have any problems. Where can you learn more? Go to http://george-phu.info/. Enter M385 in the search box to learn more about \"Atrial Flutter: Care Instructions. \"  Current as of: July 22, 2018  Content Version: 11.9  © 7038-4770 [employer]. Care instructions adapted under license by [facility] [employer] (which disclaims liability or warranty for this information).  If you have questions about a medical condition or this instruction, always ask your healthcare professional. [address] any warranty or liability for your use of this information. Information obtained by :  I understand that if any problems occur once I am at home I am to contact my physician. I understand and acknowledge receipt of the instructions indicated above.                                                                                                                                            Physician's or R.N.'s Signature                                                                  Date/Time                                                                                                                                              Patient or Representative Signature                                                          Date/Time

## 2019-02-28 NOTE — PROGRESS NOTES
RENAL At 2nd portion of stress test. Serum creatinine stable. Urine protein neligable. Nothing to add at this time. Will see as outpatient if needed but for now I think he will be ok. 
 
  
Renal US report noted TECHNIQUE: Routine ultrasound images of the kidneys and retroperitoneum were 
obtained. 
  
FINDINGS: The right kidney measures 10.6 cm in length. The left kidney measures 10.0 cm in length. Both kidneys demonstrate normal cortical echogenicity. No 
renal calculus is seen. There is no hydronephrosis. There is a 1.3 cm cyst in 
the interpolar region of the left kidney. The abdominal aorta is normal in 
caliber. The common iliac artery origins are normal. The urinary bladder 
demonstrates no filling defect. The inferior vena cava is patent. 
  
IMPRESSION IMPRESSION: Small left renal cyst. Otherwise normal renal ultrasound.

## 2019-02-28 NOTE — PROGRESS NOTES
[facility] Medical Progress Note NAME: Agustina Bassett :  1965 MRM:  [id number] Date/Time: 2019  11:47 AM 
 
  
Assessment and Plan:  
 
Uncontrolled hypertension - POA, and initially required ICU for Cardene gtt; now weaned off. Stable now on Norvasc, Coreg, lisinopril 
  
New onset atrial flutter - Now rate controlled. Started on Eliquis. Rate control with Coreg. Stress test underway. 2nd half today. DC home if normal, likely cath if abnormal.  ECHO showed \"estimated left ventricular ejection fraction is 56 - 60%. Left ventricular moderate concentric hypertrophy. Normal left ventricular wall motion, no regional wall motion abnormality noted. \" 
  
Syncope and collapse - Prior to admit, unclear etiology. ?tachyarrhthmia. MRI brain showed old lacunar infarct. Appreciate neuro evaluation, EEG negative for seizures 
  
RAJ (acute kidney injury) - POA, now resolved. Monitor BMP. Renal US unremarkable.  
  
Obesity - likely has CANDICE. Needs outpatient polysomnography. Advised weight loss Subjective: Chief Complaint:  Feels fine, no complaints  Wants to go home. ROS: 
(bold if positive, if negative) Tolerating PT  Tolerating Diet Objective:  
 
Last 24hrs VS reviewed since prior progress note. Most recent are: 
 
Visit Vitals BP (!) 152/94 (BP 1 Location: Right arm, BP Patient Position: At rest) Pulse (!) 103 Temp 97.8 °F (36.6 °C) Resp 26 Ht 6' 2\" (1.88 m) Wt 124.7 kg (275 lb) SpO2 93% BMI 35.31 kg/m² SpO2 Readings from Last 6 Encounters:  
19 93% Intake/Output Summary (Last 24 hours) at 2019 1147 Last data filed at 2019 8240 Gross per 24 hour Intake 295 ml Output 975 ml Net -680 ml Physical Exam: 
 
Gen:  Obese, in no acute distress HEENT:  Pink conjunctivae, PERRL, hearing intact to voice, moist mucous membranes Neck:  Supple, without masses, thyroid non-tender Resp:  No accessory muscle use, clear breath sounds without wheezes rales or rhonchi 
Card:  No murmurs, irregular tachycardic S1, S2 without thrills, bruits or peripheral edema Abd:  Soft, non-tender, non-distended, normoactive bowel sounds are present, no mass Lymph:  No cervical or inguinal adenopathy Musc:  No cyanosis or clubbing Skin:  No rashes or ulcers, skin turgor is good Neuro:  Cranial nerves are grossly intact, no focal motor weakness, follows commands Psych:  Poor insight, oriented to person, place and time, alert Telemetry reviewed:   flutter 
__________________________________________________________________ Medications Reviewed: (see below) Medications:  
 
Current Facility-Administered Medications Medication Dose Route Frequency  carvedilol (COREG) tablet 25 mg  25 mg Oral BID WITH MEALS  lisinopril (PRINIVIL, ZESTRIL) tablet 20 mg  20 mg Oral DAILY  apixaban (ELIQUIS) tablet 5 mg  5 mg Oral BID  sodium chloride (NS) flush 5-40 mL  5-40 mL IntraVENous Q8H  
 sodium chloride (NS) flush 5-40 mL  5-40 mL IntraVENous PRN  
 acetaminophen (TYLENOL) tablet 650 mg  650 mg Oral Q4H PRN  
 HYDROcodone-acetaminophen (NORCO) 5-325 mg per tablet 1 Tab  1 Tab Oral Q4H PRN  
 HYDROmorphone (PF) (DILAUDID) injection 0.5 mg  0.5 mg IntraVENous Q4H PRN  
 naloxone (NARCAN) injection 0.4 mg  0.4 mg IntraVENous PRN  
 diphenhydrAMINE (BENADRYL) injection 12.5 mg  12.5 mg IntraVENous Q4H PRN  
 ondansetron (ZOFRAN) injection 4 mg  4 mg IntraVENous Q6H PRN  
 docusate sodium (COLACE) capsule 100 mg  100 mg Oral BID  hydrALAZINE (APRESOLINE) 20 mg/mL injection 10 mg  10 mg IntraVENous Q6H PRN  
 amLODIPine (NORVASC) tablet 10 mg  10 mg Oral DAILY  nicotine (NICODERM CQ) 21 mg/24 hr patch 1 Patch  1 Patch TransDERmal DAILY  sodium chloride (NS) flush 5-40 mL  5-40 mL IntraVENous Q8H  
 sodium chloride (NS) flush 5-40 mL  5-40 mL IntraVENous PRN  
  aspirin delayed-release tablet 81 mg  81 mg Oral DAILY  labetalol (NORMODYNE;TRANDATE) 20 mg/4 mL (5 mg/mL) injection 20 mg  20 mg IntraVENous Q4H PRN Lab Data Reviewed: (see below) Lab Review:  
 
Recent Labs  
  02/28/19 0522 02/27/19 0334 02/26/19 0355 WBC 10.6 10.5 10.6 HGB 12.8 12.8 13.6 HCT 39.2 39.2 41.9  210 221 Recent Labs  
  02/28/19 0522 02/27/19 0334 02/26/19 0355 * 136 134* K 4.3 4.0 4.1  103 102 CO2 21 20* 19* GLU 97 99 101* BUN 16 11 9 CREA 1.17 1.08 0.98  
CA 8.7 8.4* 8.5  8.8 MG 1.9 1.8 2.0 PHOS 3.8 3.3 3.0 ALB  --   --  3.6 TBILI  --   --  0.5 SGOT  --   --  28 ALT  --   --  40 No results found for: Herlinda France No results for input(s): PH, PCO2, PO2, HCO3, FIO2 in the last 72 hours. No results for input(s): INR in the last 72 hours. No lab exists for component: INREXT, INREXT All Micro Results Procedure Component Value Units Date/Time Leandra Still [[id number]] Collected:  02/24/19 2153 Order Status:  Completed Specimen:  Urine from Clean catch Updated:  02/26/19 1331 Special Requests: ADD ON TO UA ALREADY SENT [place] Count <1,000 CFU/ML Culture result: NO GROWTH 1 DAY URINE CULTURE HOLD SAMPLE [[id number]] Collected:  02/24/19 2155 Order Status:  Completed Specimen:  Urine from Serum Updated:  02/24/19 2202 Urine culture hold URINE ON HOLD IN MICROBIOLOGY DEPT FOR 3 DAYS. IF UNPRESERVED URINE IS SUBMITTED, IT CANNOT BE USED FOR ADDITIONAL TESTING AFTER 24 HRS, RECOLLECTION WILL BE REQUIRED. I have reviewed notes of prior 24hr. Other pertinent lab: none Total time spent with patient: 45 Minutes Care Plan discussed with: Patient, Family, Care Manager, Nursing Staff, Consultant/Specialist and >50% of time spent in counseling and coordination of care Discussed:  Care Plan and D/C Planning Prophylaxis:  Lovenox and H2B/PPI Disposition:  Home w/Family 
        
___________________________________________________ Attending Physician: Emilia Aviles MD

## 2019-02-28 NOTE — PROGRESS NOTES
Cardiology Progress Note       NAME:  Avery Magdaleno :   1965 MRN:   [id number] Assessment/Plan: 1. A flutter CVR: cont coreg, eliquis. For part 2 of stress test this am.   ECHO LVEF 60%. Consider OP EP eval for a flutter ablation. 2. Syncope: no further episodes 3. HTN: cont current meds , Norvasc 4. Renal insufficiency 5. Leucocytosis: UC neg, WBCs normalized 6. Old CVA on CT head: neurology following Starleen Chute for discharge today of stress is nml. Follow-up Information Follow up With Specialties Details Why Contact Info Fatou Bustillos MD Cardiology On 3/12/2019 11 am  [address] [facility] [address] [place] [address] 
[phone] 
  
  
 
  
 
 
19 ECHO ADULT COMPLETE 2019 Narrative · Estimated left ventricular ejection fraction is 56 - 60%. Left  
ventricular moderate concentric hypertrophy. Normal left ventricular wall  
motion, no regional wall motion abnormality noted. Signed by: Fatou Bustillos MD  
 
 
Pt personally seen and examined. Chart reviewed. Agree with advanced NP's history, exam and  A/P with changes/additons. Stress nuc reviewed Can be DC'd from cardiac standpoint Discussed with patient/nursing/family Ross Brantley MD, [facility] Subjective:  
47 yo hx of HTN, admitted for aflutter / syncope.  The patient was apparently sitting at home  when he \"passed out\" for several minutes. Pt does not remember the episode.  His mother found him and called EMS. No CP/dyspnea/swelling LE. In ED - EKG - aflutter with CVR. Trop WNL; WBC 14.5, Cr 1.4 No prior hx of CAD/arrhythmia Drug screen + THC 
 
CT Head - lacunar infarcts 
  
 
Cardiac ROS: Patient denies any exertional chest pain, dyspnea, palpitations, syncope, orthopnea, edema or paroxysmal nocturnal dyspnea. Review of Systems: No nausea, indigestion, vomiting, pain, cough, sputum. No bleeding. NPO for stress test   
 
 
 
 
Objective:  
 
Visit Vitals /75 (BP 1 Location: Right arm, BP Patient Position: At rest;Supine) Pulse 93 Temp 97.9 °F (36.6 °C) Resp 19 Ht 6' 2\" (1.88 m) Wt 275 lb (124.7 kg) SpO2 96% BMI 35.31 kg/m² O2 Device: Room air Temp (24hrs), Av °F (36.7 °C), Min:97.8 °F (36.6 °C), Max:98.5 °F (36.9 °C) No intake/output data recorded.  1901 -  0700 In: 1395.8 [P.O.:160; I.V.:1235.8] Out: 2355 [Urine:2355] TELE: a flutter General: AAOx3 cooperative, no acute distress. HEENT: Atraumatic. Pink and moist.  Anicteric sclerae. Neck : Supple, no thyromegaly. Lungs: CTA bilaterally. No wheezing/rhonchi/rales. Heart: irregular rhythm, no murmur, no rubs, no gallops. No JVD. Abdomen: Soft, non-distended, non-tender. + Bowel sounds. Extremities: No edema, no clubbing, no cyanosis. Neurologic: Grossly intact. Alert and oriented X 3. No acute neurological distress. Psych: Fair insight. Not anxious or agitated. Care Plan discussed with: 
  Comments Patient x Family RN x Care Manager x Consultant:  x intensivist   
 
 
Data Review: No lab exists for component: ITNL No results for input(s): CPK, CKMB, TROIQ in the last 72 hours. Recent Labs  
  19 
0522 19 
0334 19 
0355 * 136 134* K 4.3 4.0 4.1  103 102 CO2 21 20* 19* BUN 16 11 9 CREA 1.17 1.08 0.98  
GLU 97 99 101* PHOS 3.8 3.3 3.0 MG 1.9 1.8 2.0 ALB  --   --  3.6 WBC 10.6 10.5 10.6 HGB 12.8 12.8 13.6 HCT 39.2 39.2 41.9  210 221 No results for input(s): INR, PTP, APTT in the last 72 hours. No lab exists for component: INREXT, INREXT Medications reviewed Current Facility-Administered Medications Medication Dose Route Frequency  regadenoson (LEXISCAN) injection 0.4 mg  0.4 mg IntraVENous RAD ONCE  
  carvedilol (COREG) tablet 25 mg  25 mg Oral BID WITH MEALS  lisinopril (PRINIVIL, ZESTRIL) tablet 20 mg  20 mg Oral DAILY  apixaban (ELIQUIS) tablet 5 mg  5 mg Oral BID  sodium chloride (NS) flush 5-40 mL  5-40 mL IntraVENous Q8H  
 sodium chloride (NS) flush 5-40 mL  5-40 mL IntraVENous PRN  
 acetaminophen (TYLENOL) tablet 650 mg  650 mg Oral Q4H PRN  
 HYDROcodone-acetaminophen (NORCO) 5-325 mg per tablet 1 Tab  1 Tab Oral Q4H PRN  
 HYDROmorphone (PF) (DILAUDID) injection 0.5 mg  0.5 mg IntraVENous Q4H PRN  
 naloxone (NARCAN) injection 0.4 mg  0.4 mg IntraVENous PRN  
 diphenhydrAMINE (BENADRYL) injection 12.5 mg  12.5 mg IntraVENous Q4H PRN  
 ondansetron (ZOFRAN) injection 4 mg  4 mg IntraVENous Q6H PRN  
 docusate sodium (COLACE) capsule 100 mg  100 mg Oral BID  hydrALAZINE (APRESOLINE) 20 mg/mL injection 10 mg  10 mg IntraVENous Q6H PRN  
 amLODIPine (NORVASC) tablet 10 mg  10 mg Oral DAILY  niCARdipine (CARDENE) 25 mg in 0.9% sodium chloride 250 mL infusion  0-15 mg/hr IntraVENous TITRATE  nicotine (NICODERM CQ) 21 mg/24 hr patch 1 Patch  1 Patch TransDERmal DAILY  sodium chloride (NS) flush 5-40 mL  5-40 mL IntraVENous Q8H  
 sodium chloride (NS) flush 5-40 mL  5-40 mL IntraVENous PRN  
 aspirin delayed-release tablet 81 mg  81 mg Oral DAILY  labetalol (NORMODYNE;TRANDATE) 20 mg/4 mL (5 mg/mL) injection 20 mg  20 mg IntraVENous Q4H PRN Pati Randall NP, [employer] Student

## 2019-02-28 NOTE — DISCHARGE SUMMARY
Physician Discharge Summary     Patient ID:  Christiano Baptiste  [id number]  34 y.o.  1965    Admit date: 2/24/2019    Discharge date and time: 2/28/2019    Admission Diagnoses: Syncope and collapse [R55]  New onset atrial flutter ([address] [I48.92]    Discharge Diagnoses:    Principal Diagnosis   New onset atrial flutter ([address]                                             Other Diagnoses    Syncope and collapse (2/24/2019)    RAJ (acute kidney injury) ([facility] (2/24/2019)    Uncontrolled hypertension (2/24/2019)     Hospital Course:   Uncontrolled hypertension / Hypertensive Urgency: SBP > 180 or DBP > 120 in the absence of progressive target organ dysfunction  - POA, and initially required ICU for Cardene gtt; now weaned off.  Stable now on Norvasc, Coreg, lisinopril     New onset atrial flutter - Now rate controlled. Started on Eliquis. Rate control with Coreg. Stress test underway. 2nd half today. DC home if normal, likely cath if abnormal.  ECHO showed \"estimated left ventricular ejection fraction is 56 - 60%. Left ventricular moderate concentric hypertrophy. Normal left ventricular wall motion, no regional wall motion abnormality noted. \"     Syncope and collapse - Prior to admit, unclear etiology. ?tachyarrhthmia. MRI brain showed old lacunar infarct. Appreciate neuro evaluation, EEG negative for seizures     RAJ (acute kidney injury) - POA, now resolved. Monitor BMP. Renal US unremarkable.      Obesity - likely has CANDICE. Needs outpatient polysomnography. Advised weight loss     PCP: Other, MD Lily    Consults: Cardiology    Significant Diagnostic Studies: See Hospital Course    Discharged home in improved condition.     Discharge Exam:  BP (!) 152/94 (BP 1 Location: Right arm, BP Patient Position: At rest)   Pulse (!) 103   Temp 97.8 °F (36.6 °C)   Resp 26   Ht 6' 2\" (1.88 m)   Wt 124.7 kg (275 lb)   SpO2 93%   BMI 35.31 kg/m²      Gen:  Obese, in no acute distress  HEENT:  Pink conjunctivae, PERRL, hearing intact to voice, moist mucous membranes  Neck:  Supple, without masses, thyroid non-tender  Resp:  No accessory muscle use, clear breath sounds without wheezes rales or rhonchi  Card:  No murmurs, irregular tachycardic S1, S2 without thrills, bruits or peripheral edema  Abd:  Soft, non-tender, non-distended, normoactive bowel sounds are present, no mass  Lymph:  No cervical or inguinal adenopathy  Musc:  No cyanosis or clubbing  Skin:  No rashes or ulcers, skin turgor is good  Neuro:  Cranial nerves are grossly intact, no focal motor weakness, follows commands   Psych:  Poor insight, oriented to person, place and time, alert    Patient Instructions:   Current Discharge Medication List      START taking these medications    Details   amLODIPine (NORVASC) 10 mg tablet Take 1 Tab by mouth daily for 30 days. Qty: 30 Tab, Refills: 0      aspirin delayed-release 81 mg tablet Take 1 Tab by mouth daily for 30 days. Qty: 30 Tab, Refills: 0      carvedilol (COREG) 25 mg tablet Take 1 Tab by mouth two (2) times daily (with meals) for 30 days. Qty: 60 Tab, Refills: 0      lisinopril (PRINIVIL, ZESTRIL) 20 mg tablet Take 1 Tab by mouth daily for 30 days. Qty: 30 Tab, Refills: 0      apixaban (ELIQUIS) 5 mg tablet Take 1 Tab by mouth two (2) times a day. Qty: 30 Tab, Refills: 0           Activity: Activity as tolerated  Diet: Cardiac Diet and no caffeine  Wound Care: None needed    Follow-up with your PCP at [facility] in a few weeks and cardiology in a few weeks.   Follow-up tests/labs - EP test per cardiology    Signed:  Lise Schroeder MD  2/28/2019  11:57 AM

## 2019-02-28 NOTE — PROGRESS NOTES
Discharge is pending the results of the second part of pt.'s stress test which will be read later this afternoon. I discussed pt with cardiology NP. Wife obtained one free month of eliquis with the coupon . She reenrolled pt in the [facility] system as per our discussion yesterday. Pt will need to continue receiving his care @ the [place] and will be able to obtain his medications through the [facility]. There are no other discharge needs identified (no home health,no hospital to home,no rehab and no outpatient therapy needs). From a case management perspective,pt is okay for discharge.  
 
Elicia Grijalva

## 2019-02-28 NOTE — PROGRESS NOTES
0700: Bedside shift change report given to Raciel Regalado (oncoming nurse) by Natanael Bhat RN (offgoing nurse). Report included the following information SBAR, Kardex, ED Summary, Procedure Summary and MAR.  
0800: Initial assessment performed. Patient alert and oriented and resting comfortably in bed. Patient on room air with oxygen saturations in upper 90%s. Patient does not appear to be in pain and no distress noted. VSS. Call bell within reach. No additional needs at this time. Will continue to monitor patient. 0845: Patient to stress test/Echo with transport. Patient on portable heart monitor. 1110: Patient returned from stress test/Echo. Family at bedside. 1200: Reassessment performed. No new changes. VSS, Family at bedside. Call bell within reach. No additional needs at this time. Will continue to monitor patient. 1345: Patient D/C with family member. VSS. Wheelchair refused.

## 2019-02-28 NOTE — PROGRESS NOTES
Problem: Falls - Risk of 
Goal: *Absence of Falls Document Lico Connells Fall Risk and appropriate interventions in the flowsheet. Outcome: Progressing Towards Goal 
Fall Risk Interventions: 
  
 
  
 
Medication Interventions: Evaluate medications/consider consulting pharmacy, Patient to call before getting OOB, Teach patient to arise slowly Elimination Interventions: Call light in reach, Elevated toilet seat

## 2019-02-28 NOTE — PROGRESS NOTES
Spiritual Care Assessment/Progress Note [facility] 
 
 
NAME: Agsutina Bassett      MRN: [id number] AGE: 48 y.o. SEX: male Christianity Affiliation: No preference Language: English  
 
2/28/2019     Total Time (in minutes): 8 Spiritual Assessment begun in [facility] 3 INTENSIVE CARE through conversation with: 
  
    [x]Patient        [] Family    [x] Friend(s) Reason for Consult: Initial/Spiritual assessment, patient floor Spiritual beliefs: (Please include comment if needed) 
   [] Identifies with a don tradition:     
   [] Supported by a don community:        
   [x] Claims no spiritual orientation:       
   [] Seeking spiritual identity:            
   [] Adheres to an individual form of spirituality:       
   [] Not able to assess:                   
 
    
Identified resources for coping:  
   [] Prayer                           
   [] Music                  [] Guided Imagery [x] Family/friends                 [] Pet visits [] Devotional reading                         [] Unknown 
   [] Other:                                         
 
 
Interventions offered during this visit: (See comments for more details) Patient Interventions: Affirmation of emotions/emotional suffering, Coping skills reviewed/reinforced, Iconic (affirming the presence of God/Higher Power) Family/Friend(s): Iconic (affirming the presence of God/Higher Power), Coping skills reviewed/reinforced(Friend) Plan of Care: 
 
 [] Support spiritual and/or cultural needs  
 [] Support AMD and/or advance care planning process    
 [] Support grieving process 
 [] Coordinate Rites and/or Rituals  
 [] Coordination with community clergy [] No spiritual needs identified at this time 
 [] Detailed Plan of Care below (See Comments)  [] Make referral to Music Therapy 
[] Make referral to Pet Therapy    
[] Make referral to Addiction services 
[] Make referral to [facility] [] Make referral to Spiritual Care Partner 
[] No future visits requested       
[x] Follow up visits as needed Comments:  visit for initial spiritual assessment. Sitting up in bed, good eye contact, good natured. Says he is feeling better and is looking forward to being discharged home as soon as able. Provided spiritual presence and listening as he spoke briefly about his present thoughts, feelings, and concerns. Did not identify any particular spiritual care needs at this time. He appeared encouraged as a result of this visit and expressed gratitude for thsi visit. Visited by Rev. Winifred Schultz, [address]  paging service: [phone])

## 2019-02-28 NOTE — CDMP QUERY
Patient admitted with new onset of afib  noted to have uncontrolled HTN. If possible, please document in progress notes and d/c summary if you are evaluating and/or treating any of the following: 
 
=> Hypertensive Urgency 
=> Hypertensive Crisis 
=> Hypertensive Emergency  
=> Other Explanation of clinical findings 
=> Clinically Undetermined (no explanation for clinical findings) The medical record reflects the following: 
   Risk Factors: 47 yo M admitted with new onset on AFIB Clinical Indicators: /103   163/128 H&P sates \" Uncontrolled hypertension,with nitropaste, IV labetalol, IV hydralazine. Adding Norvasc. Continue Coreg. \" Please clarify and document your clinical opinion in the progress notes and discharge summary including the definitive and/or presumptive diagnosis, (suspected or probable), related to the above clinical findings. Please include clinical findings supporting your diagnosis. REFERENCE: 
----------------------------------------------- Hypertensive Urgency: SBP > 180 or DBP > 120 in the absence of progressive target organ dysfunction Hypertensive Crisis: BP elevates rapidly and severely enough to potentially cause organ damage (e.g. severe HA, SOB, nosebleed, severe anxiety, nausea/vomiting, etc.) Hypertensive Emergency: SBP >180 or DBP > 120 with associated organ dysfunction (e.g. CVA, LOC, memory loss, MI, RAJ, aortic dissection, angina, pulmonary edema, encephalopathy, retinopathy, HELLP, etc.) Thank you for your time [facility] RN/BSN, CCDS Desk:   [phone] Other:  [phone]

## 2019-02-28 NOTE — PROGRESS NOTES
Bedside and Verbal shift change report given to Kassi Rawls (oncoming nurse) by Irina Spain RN (offgoing nurse). Report included the following information SBAR and Kardex.

## 2019-03-29 ENCOUNTER — APPOINTMENT (OUTPATIENT)
Dept: GENERAL RADIOLOGY | Age: 54
DRG: 312 | End: 2019-03-29
Attending: EMERGENCY MEDICINE
Payer: OTHER GOVERNMENT

## 2019-03-29 ENCOUNTER — HOSPITAL ENCOUNTER (INPATIENT)
Age: 54
LOS: 1 days | Discharge: HOME | DRG: 312 | End: 2019-03-29
Attending: EMERGENCY MEDICINE | Admitting: INTERNAL MEDICINE
Payer: OTHER GOVERNMENT

## 2019-03-29 ENCOUNTER — CLINICAL SUPPORT (OUTPATIENT)
Dept: CARDIOLOGY CLINIC | Age: 54
End: 2019-03-29

## 2019-03-29 VITALS
OXYGEN SATURATION: 98 % | HEIGHT: 74 IN | SYSTOLIC BLOOD PRESSURE: 143 MMHG | RESPIRATION RATE: 23 BRPM | WEIGHT: 274.25 LBS | BODY MASS INDEX: 35.2 KG/M2 | TEMPERATURE: 97.4 F | DIASTOLIC BLOOD PRESSURE: 86 MMHG | HEART RATE: 82 BPM

## 2019-03-29 DIAGNOSIS — N17.9 AKI (ACUTE KIDNEY INJURY) (HCC): ICD-10-CM

## 2019-03-29 DIAGNOSIS — R55 SYNCOPE AND COLLAPSE: Primary | ICD-10-CM

## 2019-03-29 DIAGNOSIS — R55 SYNCOPE, UNSPECIFIED SYNCOPE TYPE: Primary | ICD-10-CM

## 2019-03-29 DIAGNOSIS — R00.1 BRADYCARDIA: ICD-10-CM

## 2019-03-29 PROBLEM — I48.0 PAF (PAROXYSMAL ATRIAL FIBRILLATION) (HCC): Status: ACTIVE | Noted: 2019-03-29

## 2019-03-29 LAB
ALBUMIN SERPL-MCNC: 3.1 G/DL (ref 3.5–5)
ALBUMIN/GLOB SERPL: 0.7 {RATIO} (ref 1.1–2.2)
ALP SERPL-CCNC: 78 U/L (ref 45–117)
ALT SERPL-CCNC: 30 U/L (ref 12–78)
AMPHET UR QL SCN: NEGATIVE
ANION GAP SERPL CALC-SCNC: 5 MMOL/L (ref 5–15)
ANION GAP SERPL CALC-SCNC: 9 MMOL/L (ref 5–15)
AST SERPL-CCNC: 19 U/L (ref 15–37)
ATRIAL RATE: 58 BPM
BARBITURATES UR QL SCN: NEGATIVE
BASOPHILS # BLD: 0.1 K/UL (ref 0–0.1)
BASOPHILS NFR BLD: 1 % (ref 0–1)
BENZODIAZ UR QL: NEGATIVE
BILIRUB SERPL-MCNC: 0.3 MG/DL (ref 0.2–1)
BUN SERPL-MCNC: 12 MG/DL (ref 6–20)
BUN SERPL-MCNC: 16 MG/DL (ref 6–20)
BUN/CREAT SERPL: 10 (ref 12–20)
BUN/CREAT SERPL: 12 (ref 12–20)
CALCIUM SERPL-MCNC: 8.2 MG/DL (ref 8.5–10.1)
CALCIUM SERPL-MCNC: 8.3 MG/DL (ref 8.5–10.1)
CALCULATED P AXIS, ECG09: 49 DEGREES
CALCULATED R AXIS, ECG10: 57 DEGREES
CALCULATED T AXIS, ECG11: 27 DEGREES
CANNABINOIDS UR QL SCN: POSITIVE
CHLORIDE SERPL-SCNC: 104 MMOL/L (ref 97–108)
CHLORIDE SERPL-SCNC: 107 MMOL/L (ref 97–108)
CO2 SERPL-SCNC: 23 MMOL/L (ref 21–32)
CO2 SERPL-SCNC: 25 MMOL/L (ref 21–32)
COCAINE UR QL SCN: NEGATIVE
COMMENT, HOLDF: NORMAL
CREAT SERPL-MCNC: 0.99 MG/DL (ref 0.7–1.3)
CREAT SERPL-MCNC: 1.67 MG/DL (ref 0.7–1.3)
DIAGNOSIS, 93000: NORMAL
DIFFERENTIAL METHOD BLD: ABNORMAL
DRUG SCRN COMMENT,DRGCM: ABNORMAL
EOSINOPHIL # BLD: 0.2 K/UL (ref 0–0.4)
EOSINOPHIL NFR BLD: 2 % (ref 0–7)
ERYTHROCYTE [DISTWIDTH] IN BLOOD BY AUTOMATED COUNT: 13.9 % (ref 11.5–14.5)
GLOBULIN SER CALC-MCNC: 4.7 G/DL (ref 2–4)
GLUCOSE SERPL-MCNC: 103 MG/DL (ref 65–100)
GLUCOSE SERPL-MCNC: 116 MG/DL (ref 65–100)
HCT VFR BLD AUTO: 36.7 % (ref 36.6–50.3)
HGB BLD-MCNC: 11.8 G/DL (ref 12.1–17)
IMM GRANULOCYTES # BLD AUTO: 0 K/UL (ref 0–0.04)
IMM GRANULOCYTES NFR BLD AUTO: 0 % (ref 0–0.5)
INR PPP: 1.2 (ref 0.9–1.1)
LIPASE SERPL-CCNC: 139 U/L (ref 73–393)
LYMPHOCYTES # BLD: 3.4 K/UL (ref 0.8–3.5)
LYMPHOCYTES NFR BLD: 33 % (ref 12–49)
MAGNESIUM SERPL-MCNC: 1.7 MG/DL (ref 1.6–2.4)
MCH RBC QN AUTO: 27.6 PG (ref 26–34)
MCHC RBC AUTO-ENTMCNC: 32.2 G/DL (ref 30–36.5)
MCV RBC AUTO: 85.7 FL (ref 80–99)
METHADONE UR QL: NEGATIVE
MONOCYTES # BLD: 0.8 K/UL (ref 0–1)
MONOCYTES NFR BLD: 8 % (ref 5–13)
NEUTS SEG # BLD: 5.8 K/UL (ref 1.8–8)
NEUTS SEG NFR BLD: 56 % (ref 32–75)
NRBC # BLD: 0 K/UL (ref 0–0.01)
NRBC BLD-RTO: 0 PER 100 WBC
OPIATES UR QL: NEGATIVE
P-R INTERVAL, ECG05: 196 MS
PCP UR QL: NEGATIVE
PLATELET # BLD AUTO: 220 K/UL (ref 150–400)
PMV BLD AUTO: 11.9 FL (ref 8.9–12.9)
POTASSIUM SERPL-SCNC: 3.9 MMOL/L (ref 3.5–5.1)
POTASSIUM SERPL-SCNC: ABNORMAL MMOL/L (ref 3.5–5.1)
PROT SERPL-MCNC: 7.8 G/DL (ref 6.4–8.2)
PROTHROMBIN TIME: 12.2 SEC (ref 9–11.1)
Q-T INTERVAL, ECG07: 410 MS
QRS DURATION, ECG06: 98 MS
QTC CALCULATION (BEZET), ECG08: 402 MS
RBC # BLD AUTO: 4.28 M/UL (ref 4.1–5.7)
SAMPLES BEING HELD,HOLD: NORMAL
SODIUM SERPL-SCNC: 136 MMOL/L (ref 136–145)
SODIUM SERPL-SCNC: 137 MMOL/L (ref 136–145)
TROPONIN I BLD-MCNC: <0.04 NG/ML (ref 0–0.08)
TROPONIN I SERPL-MCNC: <0.05 NG/ML
VENTRICULAR RATE, ECG03: 58 BPM
WBC # BLD AUTO: 10.3 K/UL (ref 4.1–11.1)

## 2019-03-29 PROCEDURE — 80053 COMPREHEN METABOLIC PANEL: CPT

## 2019-03-29 PROCEDURE — 96361 HYDRATE IV INFUSION ADD-ON: CPT

## 2019-03-29 PROCEDURE — 74011250637 HC RX REV CODE- 250/637: Performed by: INTERNAL MEDICINE

## 2019-03-29 PROCEDURE — 85610 PROTHROMBIN TIME: CPT

## 2019-03-29 PROCEDURE — 74011250636 HC RX REV CODE- 250/636: Performed by: EMERGENCY MEDICINE

## 2019-03-29 PROCEDURE — 74011250636 HC RX REV CODE- 250/636: Performed by: INTERNAL MEDICINE

## 2019-03-29 PROCEDURE — 99285 EMERGENCY DEPT VISIT HI MDM: CPT

## 2019-03-29 PROCEDURE — 71045 X-RAY EXAM CHEST 1 VIEW: CPT

## 2019-03-29 PROCEDURE — 83735 ASSAY OF MAGNESIUM: CPT

## 2019-03-29 PROCEDURE — 96360 HYDRATION IV INFUSION INIT: CPT

## 2019-03-29 PROCEDURE — 85025 COMPLETE CBC W/AUTO DIFF WBC: CPT

## 2019-03-29 PROCEDURE — 65660000000 HC RM CCU STEPDOWN

## 2019-03-29 PROCEDURE — 84484 ASSAY OF TROPONIN QUANT: CPT

## 2019-03-29 PROCEDURE — 93005 ELECTROCARDIOGRAM TRACING: CPT

## 2019-03-29 PROCEDURE — 80307 DRUG TEST PRSMV CHEM ANLYZR: CPT

## 2019-03-29 PROCEDURE — 36415 COLL VENOUS BLD VENIPUNCTURE: CPT

## 2019-03-29 PROCEDURE — 83690 ASSAY OF LIPASE: CPT

## 2019-03-29 RX ORDER — SODIUM CHLORIDE 0.9 % (FLUSH) 0.9 %
5-40 SYRINGE (ML) INJECTION EVERY 8 HOURS
Status: DISCONTINUED | OUTPATIENT
Start: 2019-03-29 | End: 2019-03-29 | Stop reason: HOSPADM

## 2019-03-29 RX ORDER — AMLODIPINE BESYLATE 5 MG/1
10 TABLET ORAL DAILY
Status: DISCONTINUED | OUTPATIENT
Start: 2019-03-29 | End: 2019-03-29 | Stop reason: HOSPADM

## 2019-03-29 RX ORDER — HYDRALAZINE HYDROCHLORIDE 20 MG/ML
10 INJECTION INTRAMUSCULAR; INTRAVENOUS
Status: DISCONTINUED | OUTPATIENT
Start: 2019-03-29 | End: 2019-03-29 | Stop reason: HOSPADM

## 2019-03-29 RX ORDER — HYDRALAZINE HYDROCHLORIDE 25 MG/1
37.5 TABLET, FILM COATED ORAL 3 TIMES DAILY
Status: DISCONTINUED | OUTPATIENT
Start: 2019-03-29 | End: 2019-03-29

## 2019-03-29 RX ORDER — SODIUM CHLORIDE 0.9 % (FLUSH) 0.9 %
5-40 SYRINGE (ML) INJECTION AS NEEDED
Status: DISCONTINUED | OUTPATIENT
Start: 2019-03-29 | End: 2019-03-29 | Stop reason: HOSPADM

## 2019-03-29 RX ORDER — LISINOPRIL 20 MG/1
20 TABLET ORAL DAILY
Status: DISCONTINUED | OUTPATIENT
Start: 2019-03-29 | End: 2019-03-29

## 2019-03-29 RX ORDER — HYDRALAZINE HYDROCHLORIDE 25 MG/1
37.5 TABLET, FILM COATED ORAL 3 TIMES DAILY
Status: DISCONTINUED | OUTPATIENT
Start: 2019-03-29 | End: 2019-03-29 | Stop reason: HOSPADM

## 2019-03-29 RX ORDER — LISINOPRIL 20 MG/1
20 TABLET ORAL DAILY
Qty: 30 TAB | Refills: 0 | Status: SHIPPED
Start: 2019-03-29 | End: 2019-04-28

## 2019-03-29 RX ORDER — SODIUM CHLORIDE 9 MG/ML
100 INJECTION, SOLUTION INTRAVENOUS CONTINUOUS
Status: DISCONTINUED | OUTPATIENT
Start: 2019-03-29 | End: 2019-03-29 | Stop reason: HOSPADM

## 2019-03-29 RX ORDER — HYDRALAZINE HYDROCHLORIDE 25 MG/1
37.5 TABLET, FILM COATED ORAL 3 TIMES DAILY
Qty: 90 TAB | Refills: 0 | Status: SHIPPED | OUTPATIENT
Start: 2019-03-29 | End: 2019-04-17 | Stop reason: SDUPTHER

## 2019-03-29 RX ORDER — ASPIRIN 81 MG/1
81 TABLET ORAL DAILY
Status: DISCONTINUED | OUTPATIENT
Start: 2019-03-29 | End: 2019-03-29

## 2019-03-29 RX ADMIN — HYDRALAZINE HYDROCHLORIDE 37.5 MG: 25 TABLET, FILM COATED ORAL at 08:49

## 2019-03-29 RX ADMIN — SODIUM CHLORIDE 100 ML/HR: 900 INJECTION, SOLUTION INTRAVENOUS at 04:13

## 2019-03-29 RX ADMIN — ASPIRIN 81 MG: 81 TABLET ORAL at 08:49

## 2019-03-29 RX ADMIN — APIXABAN 5 MG: 5 TABLET, FILM COATED ORAL at 08:50

## 2019-03-29 RX ADMIN — Medication 10 ML: at 05:31

## 2019-03-29 RX ADMIN — AMLODIPINE BESYLATE 10 MG: 5 TABLET ORAL at 08:50

## 2019-03-29 RX ADMIN — Medication 10 ML: at 04:15

## 2019-03-29 RX ADMIN — HYDRALAZINE HYDROCHLORIDE 10 MG: 20 INJECTION INTRAMUSCULAR; INTRAVENOUS at 05:30

## 2019-03-29 RX ADMIN — SODIUM CHLORIDE 1000 ML: 900 INJECTION, SOLUTION INTRAVENOUS at 01:16

## 2019-03-29 NOTE — PROGRESS NOTES
Admission Medication Reconciliation: 
Comments/Recommendations: 
-Medication history obtained in the ICU (room 11) -Visitor in room provided medication history via pictures of medication bottles on cell phone. Patient receives care at [facility]. 
-Patient was instructed by [place] cardiologist to stop taking aspirin recently due to \"some bleeding from a procedure and because on long-term Eliquis\" Medications added: None Medications removed: Aspirin Medications changed: None Information obtained from: Patient, visitor providing medication list, RxQuery review Significant PMH/Disease States:  
Past Medical History:  
Diagnosis Date Hypertension Palpitations Chief Complaint for this Admission: Chief Complaint Patient presents with Chest Pain Slow Heart Rate Allergies:  Patient has no known allergies. Prior to Admission Medications:  
Prior to Admission Medications Prescriptions Last Dose Informant Patient Reported? Taking? amLODIPine (NORVASC) 10 mg tablet 3/28/2019 at 9am  No Yes Sig: Take 1 Tab by mouth daily for 30 days. apixaban (ELIQUIS) 5 mg tablet 3/28/2019 at 9pm  No Yes Sig: Take 1 Tab by mouth two (2) times a day. carvedilol (COREG) 25 mg tablet 3/28/2019 at 9pm  No Yes Sig: Take 1 Tab by mouth two (2) times daily (with meals) for 30 days. lisinopril (PRINIVIL, ZESTRIL) 20 mg tablet 3/28/2019 at 9am  No Yes Sig: Take 1 Tab by mouth daily for 30 days. Facility-Administered Medications: None Thank you, 
Edward Tomas, PHARMD

## 2019-03-29 NOTE — PROGRESS NOTES
Interviewed patient; patient is a vague historian and required prompting for some medication names/strengths. Attempting to contact [address] to confirm fill history. Will follow up. Maricruz Lainez, PharmD Candidate [phone]

## 2019-03-29 NOTE — ED NOTES
TRANSFER - OUT REPORT: 
 
Verbal report given to Juan(name) on Tadeo Tracey  being transferred to [facility](unit) for routine progression of care Report consisted of patients Situation, Background, Assessment and  
Recommendations(SBAR). Information from the following report(s) SBAR, Kardex, ED Summary, MAR, Recent Results and Cardiac Rhythm sinus eula/NSR was reviewed with the receiving nurse. Lines:  
Peripheral IV 03/29/19 Right Antecubital (Active) Site Assessment Clean, dry, & intact 3/29/2019  1:03 AM  
Phlebitis Assessment 0 3/29/2019  1:03 AM  
Infiltration Assessment 0 3/29/2019  1:03 AM  
Dressing Status Clean, dry, & intact 3/29/2019  1:03 AM  
Hub Color/Line Status Green 3/29/2019  1:03 AM  
   
Peripheral IV 03/29/19 Left Antecubital (Active) Site Assessment Clean, dry, & intact 3/29/2019  1:15 AM  
Phlebitis Assessment 0 3/29/2019  1:15 AM  
Infiltration Assessment 0 3/29/2019  1:15 AM  
Dressing Status Clean, dry, & intact 3/29/2019  1:15 AM  
Hub Color/Line Status Green 3/29/2019  1:15 AM  
  
 
Opportunity for questions and clarification was provided. Patient transported with: 
 Monitor Registered Nurse

## 2019-03-29 NOTE — PROGRESS NOTES
Reason for Admission:   Bradycardia,syncope RRAT Score:       4 Plan for utilizing home health: There are no identified home health needs @ this time. Current Advanced Directive/Advance Care Plan:full code,no AMD on file Likelihood of Readmission:  Low to moderate Transition of Care Plan:                   
 
I met with pt and his wife to discuss discharge needs. Pt was admitted with bradycardia and syncope. I also discussed pt with cardiology NP,intensivist and pharmacist. 
 
Pt was hospitalized from 2/24/19 to 2/28/19 for hypertensive urgency and new -onset atrial fibrillation. At that time,pt was started on eliquis. Since his last admission,pt is now enrolled with [employer] [facility] [employer]. His PCP is through the [facility] and his insurance is veterans affairs. Per discussion with [employer] autotrigger  monitor is being ordered and will be mailed to pt.'s home as pt has had three episodes in less than one month of syncope . This was discussed with pt by several members of the treatment team. 
When pt is discharged,there are no home health,hospital to home,or rehab needs identified. Pt now obtains all of his medications through the [facility] so if any new medications are added,pt will need hard scripts.  
 
Mic Wilhelm

## 2019-03-29 NOTE — CONSULTS
[facility]     Name: Amanda Thomas MRN: [id number]   : 1965 Hospital: [address]   Date: 3/29/2019        Impression Plan   1. Syncope  2. Afib/flutter, s/p albation  3. HTN  4. RAJ               · EP has evaluated, plan for outpatient autotrigger monitor  · Holding BB  · Continue norvasc, hydralazine  · Monitoring renal function  · Eliquis  · Cardiac diet    Remains stable for IVCU/SD       Radiology  ( personally reviewed) CXR without acute cardiopulmonary process   ABG No results for input(s): PHI, PO2I, PCO2I in the last 72 hours. Subjective     Patient is a 48 y.o. male admitted for recurrent syncope and bradycardia. He was admitted to [facility] in February and treated for HTN and found to have atrial flutter during stress testing. Was admitted in the interim to the [place] following a recurrent syncopal episode and underwent ablation. Had a third episode of syncope at home that prompted this admission. Denies any shortness of breath, chest pain. Episodes were preceded by lightheadedness but none currently. No reports of seizure like activity. Has been in sinus since admission. Review of Systems:  A comprehensive review of systems was negative except for that written in the HPI. Past Medical History:   Diagnosis Date    Hypertension     Palpitations       No past surgical history on file. Prior to Admission medications    Medication Sig Start Date End Date Taking? Authorizing Provider   amLODIPine (NORVASC) 10 mg tablet Take 1 Tab by mouth daily for 30 days. 3/1/19 3/31/19 Yes Anne Thompson MD   carvedilol (COREG) 25 mg tablet Take 1 Tab by mouth two (2) times daily (with meals) for 30 days. 2/28/19 3/30/19 Yes Anne Thompson MD   lisinopril (PRINIVIL, ZESTRIL) 20 mg tablet Take 1 Tab by mouth daily for 30 days. 3/1/19 3/31/19 Yes Anne Thompson MD   apixaban (ELIQUIS) 5 mg tablet Take 1 Tab by mouth two (2) times a day.  19  Yes Ina Alonzo NP     Current Facility-Administered Medications   Medication Dose Route Frequency    sodium chloride (NS) flush 5-40 mL  5-40 mL IntraVENous Q8H    0.9% sodium chloride infusion  100 mL/hr IntraVENous CONTINUOUS    apixaban (ELIQUIS) tablet 5 mg  5 mg Oral BID    aspirin delayed-release tablet 81 mg  81 mg Oral DAILY    amLODIPine (NORVASC) tablet 10 mg  10 mg Oral DAILY    hydrALAZINE (APRESOLINE) tablet 37.5 mg  37.5 mg Oral TID     No Known Allergies   Social History     Tobacco Use    Smoking status: Smoker, Current Status Unknown   Substance Use Topics    Alcohol use: No     Frequency: Never      Family History   Problem Relation Age of Onset    Hypertension Father     Hypertension Mother           Laboratory: I have personally reviewed the critical care flowsheet and labs.      Recent Labs     03/29/19  0114   WBC 10.3   HGB 11.8*   HCT 36.7        Recent Labs     03/29/19  0114      K 3.9      CO2 25   *   BUN 16   CREA 1.67*   CA 8.2*   MG 1.7   ALB 3.1*   SGOT 19   ALT 30   INR 1.2*       Objective:     Mode Rate Tidal Volume Pressure FiO2 PEEP                    Vital Signs:     TMAX(24)      Intake/Output:   Last shift:         Last 3 shifts: 03/29 0701 - 03/29 1900  In: 200 [I.V.:200]  Out: 400 [Urine:400]RRIOLAST3    Intake/Output Summary (Last 24 hours) at 3/29/2019 0925  Last data filed at 3/29/2019 0800  Gross per 24 hour   Intake 378.33 ml   Output 900 ml   Net -521.67 ml     EXAM:   GENERAL: well developed and in no distress, HEENT:  PERRL, EOMI, no alar flaring or epistaxis, oral mucosa moist without cyanosis, NECK:  no jugular vein distention, no retractions, no thyromegaly or masses, LUNGS: CTAB, respirations non-labored, HEART:  Regular rate and rhythm with no MGR; no edema is present, ABDOMEN:  soft with no tenderness, bowel sounds present, EXTREMITIES:  warm with no cyanosis, SKIN:  no jaundice or ecchymosis and NEUROLOGIC:  alert and oriented, grossly non-focal    Otis Fernández MD  [facility]

## 2019-03-29 NOTE — PROGRESS NOTES
Bedside and Verbal shift change report given to [facility] RN (oncoming nurse) by Yared Golden (offgoing nurse). Report included the following information SBAR, Kardex, Recent Results, Med Rec Status, Cardiac Rhythm SR and Alarm Parameters . 0465- Patient does not want to be here. He feels like he is fine. Discussed with family and patient about his kidney function. He does want to go home. 1235- Patient stable. Blood pressure better controlled. Encouraged to drink PO fluids. Plan to drawl chemistry at 1 pm. If BUN, Crt come back normal pt to be discharged home. 1414- Patient labs improved. MD pulmonary and hospitalist aware. Orthostatics negative. 1548- Pt discharged. Pt advised not to smoke. He understood not to take his coreg. He was advised not to drink or do drugs.

## 2019-03-29 NOTE — DISCHARGE INSTRUCTIONS
Johanny will mail you a heart monitor after discharge. Instructions will be included for use. Then see Dr Itzel Vail as indicated below   Follow-up Information     Follow up With Specialties Details Why Omi Briceño MD Cardiology On 2019 1:40  [address] [phone]          HOSPITALIST DISCHARGE INSTRUCTIONS  NAME: Destinee Morin   :  1965   MRN:  [id number]     Date/Time:  3/29/2019 10:59 AM    ADMIT DATE: 3/29/2019     DISCHARGE DATE: 3/29/2019     ADMITTING DIAGNOSIS:  Bradycardia   Acute renal failure     DISCHARGE DIAGNOSIS:  As above     MEDICATIONS:  PLEASE HOLD YOUR COREG (CARVEDILOL) DUE TO YOUR LOW HEART RATE      · It is important that you take the medication exactly as they are prescribed. · Keep your medication in the bottles provided by the pharmacist and keep a list of the medication names, dosages, and times to be taken in your wallet. · Do not take other medications without consulting your doctor. Pain Management: per above medications    What to do at Home    Recommended diet:  Resume previous diet     Recommended activity: Activity as tolerated    If you experience any of the following symptoms then please call your primary care physician or return to the emergency room if you cannot get hold of your doctor:  Fever, chills, nausea, vomiting, diarrhea, change in mentation, falling, bleeding, shortness of breath, chest pain     Follow Up: Follow-up Information     Follow up With Specialties Details Why Omi Briceño MD Cardiology On 2019 1:40  [id number] [address] [facility] [phone]            Information obtained by :  I understand that if any problems occur once I am at home I am to contact my physician. I understand and acknowledge receipt of the instructions indicated above. Physician's or R.N.'s Signature                                                                  Date/Time                                                                                                                                              Patient or Representative Signature                                                          Date/Time

## 2019-03-29 NOTE — ED NOTES
Pt Throughput: Charge Nurse on ICU  made aware of patient's bed assignment, room 3011. Sapna Bojorquez, FAUSTINO Emergency Dept Charge RN.

## 2019-03-29 NOTE — ED TRIAGE NOTES
Pt. Adam Dew in by EMS for chest pain, dizziness, initial heart rate was upper 30's to low 40's, states gave two doses of 0.5 mg atropine. Pt. Feeling much better at this time. Pt. Was admitted to [place] last week for a flutter, patient was in PCP office and coded in the office, pt. Then had ablation and cardioversion a few days after that at the [facility]. Pt. Was syncopal at home tonight. Hypotensive. Pt. States was feeling similar to when coded.

## 2019-03-29 NOTE — CONSULTS
HISTORY OF PRESENTING ILLNESS      Richard Arora is a 48 y.o. male with recurrent syncope found to be bradycardic and requiring atropine IV x 2. His first episode of syncope was in February at which time he was treated for hypertension with initiation of lisinopril, coreg, norvasc, underwent nuclear stress testing which failed to reveal ischemia and was noted to have atrial flutter. He had recurrent syncope while seeing his PCP 2 weeks following discharge which lead to hospitalization at the [place] where he underwent ablation (for atrial flutter? presumably). He now presents with his third episode of syncope now. He has been compliant with all medications. He denies chest pain, palpitations however has had some fatigue. He reports all 3 episodes of syncope were similarly preceded by lightheadedness and were not associated with incontinence/tongue biting. During his hospitalization in 2/2019 at [facility], brain imaging revealed remote CVA and he was evaluated by neurology. Carotid Dopplers revealed 0-40% stenosis only. Telemetry thus far has demonstrated sinus rhythm. His coreg has been held. Previous echocardiogram demonstrated preserved LV function.         ACTIVE PROBLEM LIST     Patient Active Problem List    Diagnosis Date Noted    Syncope 03/29/2019    PAF (paroxysmal atrial fibrillation) ([facility]) 03/29/2019    Bradycardia 03/29/2019    Syncope and collapse 02/24/2019    New onset atrial flutter ([facility] [address] 02/24/2019    RAJ (acute kidney injury) ([facility] [address] 02/24/2019    HTN (hypertension) 02/24/2019           MEDICATIONS     Current Facility-Administered Medications   Medication Dose Route Frequency    sodium chloride (NS) flush 5-40 mL  5-40 mL IntraVENous Q8H    sodium chloride (NS) flush 5-40 mL  5-40 mL IntraVENous PRN    0.9% sodium chloride infusion  100 mL/hr IntraVENous CONTINUOUS    apixaban (ELIQUIS) tablet 5 mg  5 mg Oral BID    aspirin delayed-release tablet 81 mg  81 mg Oral DAILY    hydrALAZINE (APRESOLINE) 20 mg/mL injection 10 mg  10 mg IntraVENous Q2H PRN    amLODIPine (NORVASC) tablet 10 mg  10 mg Oral DAILY    hydrALAZINE (APRESOLINE) tablet 37.5 mg  37.5 mg Oral TID           PAST MEDICAL HISTORY     Past Medical History:   Diagnosis Date    Hypertension     Palpitations            PAST SURGICAL HISTORY     No past surgical history on file. ALLERGIES     No Known Allergies       FAMILY HISTORY     Family History   Problem Relation Age of Onset    Hypertension Father     Hypertension Mother     negative for cardiac disease       SOCIAL HISTORY     Social History     Socioeconomic History    Marital status: SINGLE     Spouse name: Not on file    Number of children: Not on file    Years of education: Not on file    Highest education level: Not on file   Tobacco Use    Smoking status: Smoker, Current Status Unknown   Substance and Sexual Activity    Alcohol use: No     Frequency: Never         MEDICATIONS     Current Facility-Administered Medications   Medication Dose Route Frequency    sodium chloride (NS) flush 5-40 mL  5-40 mL IntraVENous Q8H    sodium chloride (NS) flush 5-40 mL  5-40 mL IntraVENous PRN    0.9% sodium chloride infusion  100 mL/hr IntraVENous CONTINUOUS    apixaban (ELIQUIS) tablet 5 mg  5 mg Oral BID    aspirin delayed-release tablet 81 mg  81 mg Oral DAILY    hydrALAZINE (APRESOLINE) 20 mg/mL injection 10 mg  10 mg IntraVENous Q2H PRN    amLODIPine (NORVASC) tablet 10 mg  10 mg Oral DAILY    hydrALAZINE (APRESOLINE) tablet 37.5 mg  37.5 mg Oral TID       I have reviewed the nurses notes, vitals, problem list, allergy list, medical history, family, social history and medications. REVIEW OF SYMPTOMS      General: Pt denies excessive weight gain or loss. Pt is able to conduct ADL's  HEENT: Denies blurred vision, headaches, hearing loss, epistaxis and difficulty swallowing.   Respiratory: Denies cough, congestion, shortness of breath, CERNA, wheezing or stridor. Cardiovascular: Denies precordial pain, palpitations, edema or PND  Gastrointestinal: Denies poor appetite, indigestion, abdominal pain or blood in stool  Genitourinary: Denies hematuria, dysuria, increased urinary frequency  Musculoskeletal: Denies joint pain or swelling from muscles or joints  Neurologic: Denies tremor, paresthesias, headache, or sensory motor disturbance  Psychiatric: Denies confusion, insomnia, depression  Integumentray: Denies rash, itching or ulcers. Hematologic: Denies easy bruising, bleeding       PHYSICAL EXAMINATION      Vitals:    03/29/19 0500 03/29/19 0600 03/29/19 0700 03/29/19 0701   BP: (!) 178/96 169/87 (!) 168/97    Pulse: 77 75 74 72   Resp: 19 24 18    Temp:       SpO2: 98% 99% 94%    Weight:       Height:         General: Well developed, in no acute distress. HEENT: No jaundice, oral mucosa moist, no oral ulcers  Neck: Supple, no stiffness, no lymphadenopathy, supple  Heart:  Normal S1/S2 negative S3 or S4. Regular, no murmur, gallop or rub, no jugular venous distention  Respiratory: Clear bilaterally x 4, no wheezing or rales  Abdomen:   Soft, non-tender, bowel sounds are active.   Extremities:  No edema, normal cap refill, no cyanosis. Musculoskeletal: No clubbing, no deformities  Neuro: A&Ox3, speech clear, gait stable, cooperative, no focal neurologic deficits  Skin: Skin color is normal. No rashes or lesions.  Non diaphoretic, moist.  Vascular: 2+ pulses symmetric in all extremities       DIAGNOSTIC DATA      TELEMETRY: sinus rhythm       LABORATORY DATA      Lab Results   Component Value Date/Time    WBC 10.3 03/29/2019 01:14 AM    HGB 11.8 (L) 03/29/2019 01:14 AM    HCT 36.7 03/29/2019 01:14 AM    PLATELET 258 01/08/9646 01:14 AM    MCV 85.7 03/29/2019 01:14 AM      Lab Results   Component Value Date/Time    Sodium 137 03/29/2019 01:14 AM    Potassium 3.9 03/29/2019 01:14 AM    Chloride 107 03/29/2019 01:14 AM    CO2 25 03/29/2019 01:14 AM    Anion gap 5 03/29/2019 01:14 AM    Glucose 116 (H) 03/29/2019 01:14 AM    BUN 16 03/29/2019 01:14 AM    Creatinine 1.67 (H) 03/29/2019 01:14 AM    BUN/Creatinine ratio 10 (L) 03/29/2019 01:14 AM    GFR est AA 53 (L) 03/29/2019 01:14 AM    GFR est non-AA 44 (L) 03/29/2019 01:14 AM    Calcium 8.2 (L) 03/29/2019 01:14 AM    Bilirubin, total 0.3 03/29/2019 01:14 AM    AST (SGOT) 19 03/29/2019 01:14 AM    Alk. phosphatase 78 03/29/2019 01:14 AM    Protein, total 7.8 03/29/2019 01:14 AM    Albumin 3.1 (L) 03/29/2019 01:14 AM    Globulin 4.7 (H) 03/29/2019 01:14 AM    A-G Ratio 0.7 (L) 03/29/2019 01:14 AM    ALT (SGPT) 30 03/29/2019 01:14 AM           ASSESSMENT      1. Syncope, recurrent  2. Bradycardia  3. Atrial flutter  4. Hypertension  5. Obesity  6. Renal insufficiency  7. Fatigue       PLAN     Syncope is recurrent yet remains of uncertain etiology. His first episode of syncope occurred prior to initiation of antihypertensive/rate controlling agents thus I suspect his recurrent episodes are not definitively being driven by bradycardia/hypotension. Given he has experienced 3 episodes in ~1 month, would recommend an autotrigger monitor as an OP and if no syncope occurs while wearing the monitor, would consider injection of a loop recorder. Will continue norvasc, discontinue coreg and start hydralazine for BP control. If BP remains controlled by later this afternoon and no recurrent bradycardia evident, will be acceptable for DC from EP perspective with plan for follow up in 2 weeks. Thank you, Dr. Daren Mares for involving me in the care of this extraordinarily pleasant male. Please do not hesitate to contact me for further questions/concerns.          Suresh Clark MD  Cardiac Electrophysiology / Cardiology    [facility].  [address] [facility], [address] [place] [address], [address], [address] [place] [address] Drive    [address] [place] [address] [id number]  [phone] / [phone] Fax   [phone] / [phone] Fax

## 2019-03-29 NOTE — ED PROVIDER NOTES
48 y.o. male with past medical history significant for HTN, Atrial Flutter who presents from home via EMS accompanied by his Wife with chief complaint of syncope. Per wife at bedside, the pt was in bed after smoking Marijuana last night and when he went to stand up and became lightheaded. Pt then called out to his Wife for help, when he sat back down he reports an episode of LOC (does not remember episode). Wife denies any head trauma. He subsequently had onset of nausea and vomiting, he was noted to be pale and diaphoretic. EMS state upon arrival, the pt was sinus bradycardic in the 30s with a blood pressure of 95/54. EMS administered 2 doses of 0.5 mg atropine on scene, noting the pt was symptomatic for 30-40 minutes at home with EMS prior to transfer to the ED. Pt currently feels better since arrival to the ED, denies any nausea or lightheadedness at this time. Of note, the pt was recently admitted at [facility] from 2/24 -2/28 for New onset atrial flutter. Chart review shows the pt was rate controlled at the time of discharge, started on Eliquis and Coreg. Echocardiogram showed an estimated left ventricular ejection fraction is 56 - 60%, left ventricular moderate concentric hypertrophy, normal left ventricular wall motion, and no regional wall motion abnormality noted. Stress test during admission was negative. Wife additionally states the pt was admitted at the [place] on 3/6, further noting the pt was at a PCP follow-up appointment when he became bradycardic and coded while in office. Pt was transferred to the ED, where he was resuscitated. He underwent a cardioversion and then a cardiac ablation on 3/8; he was discharged on 3/9. He denies any recent medication changes or missed doses. Pt specifically denies any appetite changes, fever, chills, cough, congestion, shortness of breath, chest pain, abdominal pain, melena, hematochezia, diarrhea, dysuria, or urinary frequency. There are no other acute medical concerns at this time. PMHx: Significant for HTN, Atrial Flutter PSHx: Significant for cardiac ablation, cardioversion Social Hx: endorses tobacco use, denies EtOH use, occasional Illicit Drug use(Marijuana) Cardiology: Maranda Nageotte, MD 
PCP: Joi Mena MD 
 
Note written by Michelle Ballard Do, as dictated by Avelina Washington MD 12:59 AM 
 
The history is provided by the patient, the EMS personnel and the spouse. No  was used. Past Medical History:  
Diagnosis Date  Hypertension  Palpitations No past surgical history on file. Family History:  
Problem Relation Age of Onset  Hypertension Father  Hypertension Mother Social History Socioeconomic History  Marital status: SINGLE Spouse name: Not on file  Number of children: Not on file  Years of education: Not on file  Highest education level: Not on file Occupational History  Not on file Social Needs  Financial resource strain: Not on file  Food insecurity:  
  Worry: Not on file Inability: Not on file  Transportation needs:  
  Medical: Not on file Non-medical: Not on file Tobacco Use  Smoking status: Smoker, Current Status Unknown Substance and Sexual Activity  Alcohol use: No  
  Frequency: Never  Drug use: Not on file  Sexual activity: Not on file Lifestyle  Physical activity:  
  Days per week: Not on file Minutes per session: Not on file  Stress: Not on file Relationships  Social connections:  
  Talks on phone: Not on file Gets together: Not on file Attends Buddhism service: Not on file Active member of club or organization: Not on file Attends meetings of clubs or organizations: Not on file Relationship status: Not on file  Intimate partner violence:  
  Fear of current or ex partner: Not on file Emotionally abused: Not on file Physically abused: Not on file Forced sexual activity: Not on file Other Topics Concern  Not on file Social History Narrative  Not on file ALLERGIES: Patient has no known allergies. Review of Systems Constitutional: Positive for diaphoresis. Negative for chills and fever. HENT: Negative for congestion and sore throat. Eyes: Negative for pain. Respiratory: Negative for shortness of breath. Cardiovascular: Negative for chest pain. Gastrointestinal: Positive for nausea and vomiting. Negative for abdominal pain and diarrhea. Genitourinary: Negative for dysuria and flank pain. Musculoskeletal: Negative for back pain and neck pain. Skin: Positive for pallor. Negative for rash. Neurological: Positive for syncope and light-headedness. Negative for dizziness and headaches. Vitals:  
 03/29/19 0108 BP: (!) 83/38 Pulse: 64 Resp: 17 Temp: 97.6 °F (36.4 °C) SpO2: 92% Weight: 124.3 kg (274 lb) Height: 6' 1.5\" (1.867 m) Physical Exam  
Constitutional: He is oriented to person, place, and time. He appears well-developed and well-nourished. Nasal cannula in place. Currently on supplemental oxygen 3L with saturations at 92%. HENT:  
Head: Normocephalic. Mouth/Throat: Oropharynx is clear and moist and mucous membranes are normal.  
Eyes:  
Pale conjunctivae. Neck: Normal range of motion. Neck supple. Cardiovascular: Regular rhythm. Bradycardia present. No murmur heard. Pulmonary/Chest: Effort normal and breath sounds normal. No respiratory distress. He has no wheezes. He has no rhonchi. He has no rales. Abdominal: Soft. Bowel sounds are normal. There is no tenderness. Musculoskeletal: Normal range of motion. Neurological: He is alert and oriented to person, place, and time. No gross motor or sensory deficits Skin: Capillary refill takes less than 2 seconds. No rash noted. Cool extremities Note written by Michelle Swan, as dictated by Jarrell Sifuentes MD 12:59 AM  
 
MDM Number of Diagnoses or Management Options RAJ (acute kidney injury) ([facility] [address]):  
Bradycardia:  
Syncope and collapse:  
Diagnosis management comments: 51-year-old male with recent significant history of cardiac ablation and cardiac arrest secondary to hypertension. Presented to ER for having of syncopal episode. On the bradycardic with only mild improvement with 2 doses of atropine by EMS in the field. EKG showing sinus bradycardia ER symptoms improving. Electrolytes and troponin negative. We'll hospitalize and continue cardiac monitor. Spoke with cardiology will see the patient in the morning. Amount and/or Complexity of Data Reviewed Clinical lab tests: reviewed Tests in the radiology section of CPT®: reviewed Procedures CONSULT NOTE: 
2:19 AM 
Jarrell Sifuentes MD spoke with Dr. Christy Gotti, Consult for Hospitalist.  Discussed available diagnostic tests and clinical findings. Will admit. 2:20 AM 
Patient is being admitted to the hospital.  The results of their tests and reasons for their admission have been discussed with them and/or available family. They convey agreement and understanding for the need to be admitted and for their admission diagnosis. CONSULT NOTE: 
3:03 AM 
Jarrell Sifuentes MD spoke with Dr. Jovani Stevenson for Cardiology. Discussed available diagnostic tests and clinical findings. Dr. Phoebe Leonard agrees with the plan of admission and will consult while admitted.

## 2019-03-29 NOTE — H&P
[facility] [address] [facility], [facility] 
[phone] Admission History and Physical 
 
 
NAME:  Richard Arora :   1965 MRN:  [id number] PCP:  Jennifer Bermudez MD  
 
Date/Time:  3/29/2019 Subjective: CHIEF COMPLAINT: syncope HISTORY OF PRESENT ILLNESS:    
Mr. Reyes Flaming is a 48 y.o.  male who is admitted with syncope. Mr. Reyes Flaming with PMH of PAF, HTN, obesity was brought to ER after he passed out. According to the report, pt after smoking Marijuana went to bed and when he tries to get up, he felt light headed and later he passed out. Has Hx of syncope in the past. Later when EMS came, his HR was in the 30 s and low BP. He was given 2 amps of atropine and brought to ER. Denies palpitation or chest pain. He was admitted last month to [facility] for new aib with RVR. His stress test was normal and echo is unremarkable. After discharge, he had a FU with his PCP after discharge and in the office, he became bradycardic and coded in the office. He was taken to ER at [place] and resuscitated. He underwent cardioversion and ablation Past Medical History:  
Diagnosis Date  Hypertension  Palpitations No past surgical history on file. Social History Tobacco Use  Smoking status: Smoker, Current Status Unknown Substance Use Topics  Alcohol use: No  
  Frequency: Never Family History Problem Relation Age of Onset  Hypertension Father  Hypertension Mother No Known Allergies Prior to Admission medications Medication Sig Start Date End Date Taking? Authorizing Provider  
amLODIPine (NORVASC) 10 mg tablet Take 1 Tab by mouth daily for 30 days. 3/1/19 3/31/19  Mabel Joseph MD  
aspirin delayed-release 81 mg tablet Take 1 Tab by mouth daily for 30 days.  3/1/19 3/31/19  Mabel Joseph MD  
carvedilol (COREG) 25 mg tablet Take 1 Tab by mouth two (2) times daily (with meals) for 30 days. 2/28/19 3/30/19  Brent Brunner, MD  
lisinopril (PRINIVIL, ZESTRIL) 20 mg tablet Take 1 Tab by mouth daily for 30 days. 3/1/19 3/31/19  Brent Brunner, MD  
apixaban (ELIQUIS) 5 mg tablet Take 1 Tab by mouth two (2) times a day. 2/26/19   Jeri Khanna, NP Review of Systems: 
(bold if positive, if negative) Gen:  Eyes:  ENT:  CVS:  Pulm:  GI:   
:   
MS:  Skin:  Psych:  Endo:   
Hem:  Renal:   
Neuro:    
 
 
  
Objective: VITALS:   
Vital signs reviewed; most recent are: 
 
Visit Vitals /72 Pulse 62 Temp 97.6 °F (36.4 °C) Resp 20 Ht 6' 1.5\" (1.867 m) Wt 124.3 kg (274 lb) SpO2 95% BMI 35.66 kg/m² SpO2 Readings from Last 6 Encounters:  
03/29/19 95% 02/28/19 98% O2 Flow Rate (L/min): 3 l/min No intake or output data in the 24 hours ending 03/29/19 0235 Exam:  
 
Physical Exam: 
 
Gen:  Obese,, in no acute distress HEENT:  Pink conjunctivae, PERRL, hearing intact to voice, moist mucous membranes Neck:  Supple, without masses, thyroid non-tender Resp:  No accessory muscle use, clear breath sounds without wheezes rales or rhonchi 
Card:  No murmurs, normal S1, S2 without thrills, bruits or peripheral edema Abd:  Soft, non-tender, non-distended, normoactive bowel sounds are present, no palpable organomegaly and no detectable hernias Lymph:  No cervical or inguinal adenopathy Musc:  No cyanosis or clubbing Skin:  No rashes or ulcers, skin turgor is good Neuro:  Cranial nerves are grossly intact, no focal motor weakness, follows commands appropriately Psych:  Good insight, oriented to person, place and time, alert Labs: 
 
Recent Labs  
  03/29/19 
0114 WBC 10.3 HGB 11.8* HCT 36.7  Recent Labs  
  03/29/19 0114   
K 3.9  CO2 25 * BUN 16  
CREA 1.67* CA 8.2* MG 1.7 ALB 3.1* TBILI 0.3 SGOT 19 ALT 30 No results found for: Idris Mayo No results for input(s): PH, PCO2, PO2, HCO3, FIO2 in the last 72 hours. Recent Labs  
  03/29/19 
0114 INR 1.2* Telemetry reviewed:   sinus eula Assessment/Plan: 1. Syncope (3/29/2019), recurrent. This could be secondary to severe bradycardia. Monitor on telemetry. Check orthostatic BP. Monitor in IVC, hold BB and consult cardiology. IVF 2. RAJ (acute kidney injury) ([facility] [address] (2/24/2019). Start IVF and monitor renal function. If no improvement check U/S of the kidneys 3. HTN (hypertension) (2/24/2019). Continue lisinopril and hold coreg and amlodipine. 4.  PAF (paroxysmal atrial fibrillation) ([facility] [address] (3/29/2019)/ Bradycardia (3/29/2019). Rate now better controlled. On eliquis and ASA. Hold coreg due to bradycardia 5. Obesity. Would benefit from weight loss. 6.  Marijuana use. Check UDS. Needs counseling Previous medical records reviewed Risk of deterioration: high Total time spent with patient: 70 Minutes Care Plan discussed with: Patient, Nursing Staff and >50% of time spent in counseling and coordination of care Discussed:  Care Plan Prophylaxis:  eliquis Probable Disposition:  Home w/Family 
        
___________________________________________________ Attending Physician: Usha Perry MD

## 2019-03-29 NOTE — PROGRESS NOTES
TRANSFER - IN REPORT: 
 
Verbal report received from Enma(name) on Norman Calhoun  being received from ED(unit) for routine progression of care Report consisted of patients Situation, Background, Assessment and  
Recommendations(SBAR). Information from the following report(s) SBAR, ED Summary, Intake/Output, MAR and Recent Results was reviewed with the receiving nurse. Opportunity for questions and clarification was provided. Assessment completed upon patients arrival to unit and care assumed. Primary Nurse Ale Mcclellan RN and FAUSTINO Deluca performed a dual skin assessment on this patient No impairment noted 0500 PRN orders placed for hydralazine for sbp>170 
 
0530 10 mg hydralazine given per MAR.  
 
0600  Pt requesting bag of belonging left by wife, not belongings with pt when received from ED. Pablo Plaza RN in ED to verify no belonging left behind in ED. No update given from ED RN on pt belongings. 0715 Bedside and Verbal shift change report given to [facility] (oncoming nurse) by Sunita Rossi (offgoing nurse). Report included the following information SBAR, ED Summary, Intake/Output, MAR, Recent Results and Cardiac Rhythm sinus.

## 2019-04-02 ENCOUNTER — DOCUMENTATION ONLY (OUTPATIENT)
Dept: CARDIOLOGY CLINIC | Age: 54
End: 2019-04-02

## 2019-04-02 NOTE — PROGRESS NOTES
Received another serious loop alert    4/2/19  11:47am  Auto Triggered  Per [employer] pt was asymptomatic at home eating breakfast  Atrial Flutter with Variable Conduction w/MF PVCs (2 in 1 Min)/Run of V-Tach (3 Beats)/Artifact with rates up to 170 BPM    Informed Dr Sherryle Evangelist, NP

## 2019-04-03 ENCOUNTER — DOCUMENTATION ONLY (OUTPATIENT)
Dept: CARDIOLOGY CLINIC | Age: 54
End: 2019-04-03

## 2019-04-03 ENCOUNTER — TELEPHONE (OUTPATIENT)
Dept: CARDIOLOGY CLINIC | Age: 54
End: 2019-04-03

## 2019-04-03 RX ORDER — AMIODARONE HYDROCHLORIDE 200 MG/1
200 TABLET ORAL DAILY
Qty: 30 TAB | Refills: 3 | Status: SHIPPED | OUTPATIENT
Start: 2019-04-03 | End: 2019-04-17

## 2019-04-03 NOTE — PROGRESS NOTES
Received serious loop alerts    4/2/19  8:01pm  Auto Trigger  Atrial Fibrillation RVR Sustained w/Run of V-Tach (4- 5 Beats)/Artifact/PVCs (10 in 1 min) with rates up to 197 BPM    4/2/19  9:26pm  Auto Trigger  Atrial Fibrillation RVR Sustained w/Run of V-Tach (4 Beats)/PVCs (1 in 1 min) with rates up to 184 BPM    4/3/19  12:30am  Auto Trigger  Atrial Fibrillation RVR Sustained w/Run of V-Tach (3- 5 Beats)/PVCs (13 in 1 min) with rates up to 198 BPM    Informed Dr Sherryle Evangelist, NP

## 2019-04-11 NOTE — PROGRESS NOTES
Pt received loop per Dr Alissa Snell dx: syncope while he was in the hosp. Halley per [employer] Non chargeable visit.

## 2019-04-12 ENCOUNTER — OFFICE VISIT (OUTPATIENT)
Dept: CARDIOLOGY CLINIC | Age: 54
End: 2019-04-12

## 2019-04-12 VITALS
BODY MASS INDEX: 34.91 KG/M2 | WEIGHT: 272 LBS | DIASTOLIC BLOOD PRESSURE: 80 MMHG | SYSTOLIC BLOOD PRESSURE: 110 MMHG | RESPIRATION RATE: 20 BRPM | OXYGEN SATURATION: 96 % | HEIGHT: 74 IN | HEART RATE: 96 BPM

## 2019-04-12 DIAGNOSIS — I48.0 PAF (PAROXYSMAL ATRIAL FIBRILLATION) (HCC): Primary | ICD-10-CM

## 2019-04-12 NOTE — PROGRESS NOTES
Room # 7  PCP at the [place]  Event monitor 4/2-5/2/19    [facility] consult:3/29/19    Needs refill on Hydralazine ( may need printed prescription)    Denies any cardiac complaints at this time.     Visit Vitals  /80 (BP 1 Location: Left arm, BP Patient Position: Sitting)   Pulse 96   Resp 20   Ht 6' 1.5\" (1.867 m)   Wt 272 lb (123.4 kg)   SpO2 96%   BMI 35.40 kg/m²

## 2019-04-12 NOTE — PROGRESS NOTES
HISTORY OF PRESENTING ILLNESS      Yeyo Fernandez is a 48 y.o. male with recurrent syncope found to be bradycardic and requiring atropine IV x 2. His first episode of syncope was in February at which time he was treated for hypertension with initiation of lisinopril, coreg, norvasc, underwent nuclear stress testing which failed to reveal ischemia and was noted to have atrial fibrillation. He has thus exhibited bradycardia/tachycardia. He was started on amiodarone. ACTIVE PROBLEM LIST     Patient Active Problem List    Diagnosis Date Noted    Syncope 03/29/2019    PAF (paroxysmal atrial fibrillation) ([facility] 03/29/2019    Bradycardia 03/29/2019    Syncope and collapse 02/24/2019    [facility] 02/24/2019    RAJ (acute kidney injury) ([facility] 02/24/2019    HTN (hypertension) 02/24/2019           PAST MEDICAL HISTORY     Past Medical History:   Diagnosis Date    Hypertension     Palpitations            PAST SURGICAL HISTORY     No past surgical history on file. ALLERGIES     No Known Allergies       FAMILY HISTORY     Family History   Problem Relation Age of Onset   [facility] Hypertension Father     Hypertension Mother     negative for cardiac disease       SOCIAL HISTORY     Social History     Socioeconomic History    Marital status: SINGLE     Spouse name: Not on file    Number of children: Not on file    Years of education: Not on file    Highest education level: Not on file   Tobacco Use    Smoking status: Smoker, Current Status Unknown   Substance and Sexual Activity    Alcohol use: No     Frequency: Never         MEDICATIONS     Current Outpatient Medications   Medication Sig    amiodarone (CORDARONE) 200 mg tablet Take 1 Tab by mouth daily.  hydrALAZINE (APRESOLINE) 25 mg tablet Take 1.5 Tabs by mouth three (3) times daily.  lisinopril (PRINIVIL, ZESTRIL) 20 mg tablet Take 1 Tab by mouth daily for 30 days.     apixaban (ELIQUIS) 5 mg tablet Take 1 Tab by mouth two (2) times a day. No current facility-administered medications for this visit. I have reviewed the nurses notes, vitals, problem list, allergy list, medical history, family, social history and medications. REVIEW OF SYMPTOMS      General: Pt denies excessive weight gain or loss. Pt is able to conduct ADL's  HEENT: Denies blurred vision, headaches, hearing loss, epistaxis and difficulty swallowing. Respiratory: Denies cough, congestion, shortness of breath, CERNA, wheezing or stridor. Cardiovascular: Denies precordial pain, palpitations, edema or PND  Gastrointestinal: Denies poor appetite, indigestion, abdominal pain or blood in stool  Genitourinary: Denies hematuria, dysuria, increased urinary frequency  Musculoskeletal: Denies joint pain or swelling from muscles or joints  Neurologic: Denies tremor, paresthesias, headache, or sensory motor disturbance  Psychiatric: Denies confusion, insomnia, depression  Integumentray: Denies rash, itching or ulcers. Hematologic: Denies easy bruising, bleeding       PHYSICAL EXAMINATION      There were no vitals filed for this visit. General: Well developed, in no acute distress. HEENT: No jaundice, oral mucosa moist, no oral ulcers  Neck: Supple, no stiffness, no lymphadenopathy, supple  Heart:  Normal S1/S2 negative S3 or S4. Regular, no murmur, gallop or rub, no jugular venous distention  Respiratory: Clear bilaterally x 4, no wheezing or rales  Abdomen:   Soft, non-tender, bowel sounds are active.   Extremities:  No edema, normal cap refill, no cyanosis. Musculoskeletal: No clubbing, no deformities  Neuro: A&Ox3, speech clear, gait stable, cooperative, no focal neurologic deficits  Skin: Skin color is normal. No rashes or lesions.  Non diaphoretic, moist.  Vascular: 2+ pulses symmetric in all extremities       DIAGNOSTIC DATA      EKG:        LABORATORY DATA      Lab Results   Component Value Date/Time    WBC 10.3 03/29/2019 01:14 AM    HGB 11.8 (L) 03/29/2019 01:14 AM    HCT 36.7 03/29/2019 01:14 AM    PLATELET 682 58/31/2931 01:14 AM    MCV 85.7 03/29/2019 01:14 AM      Lab Results   Component Value Date/Time    Sodium 136 03/29/2019 01:12 PM    Potassium HEMOLYZED,RECOLLECT REQUESTED 03/29/2019 01:12 PM    Chloride 104 03/29/2019 01:12 PM    CO2 23 03/29/2019 01:12 PM    Anion gap 9 03/29/2019 01:12 PM    Glucose 103 (H) 03/29/2019 01:12 PM    BUN 12 03/29/2019 01:12 PM    Creatinine 0.99 03/29/2019 01:12 PM    BUN/Creatinine ratio 12 03/29/2019 01:12 PM    GFR est AA >60 03/29/2019 01:12 PM    GFR est non-AA >60 03/29/2019 01:12 PM    Calcium 8.3 (L) 03/29/2019 01:12 PM    Bilirubin, total 0.3 03/29/2019 01:14 AM    AST (SGOT) 19 03/29/2019 01:14 AM    Alk. phosphatase 78 03/29/2019 01:14 AM    Protein, total 7.8 03/29/2019 01:14 AM    Albumin 3.1 (L) 03/29/2019 01:14 AM    Globulin 4.7 (H) 03/29/2019 01:14 AM    A-G Ratio 0.7 (L) 03/29/2019 01:14 AM    ALT (SGPT) 30 03/29/2019 01:14 AM           ASSESSMENT      1. Syncope, recurrent  2. Bradycardia  3. Atrial flutter  4. Hypertension  5. Obesity  6. Renal insufficiency  7. Fatigue  8. Atrial fibrillation       PLAN     Recommend implantation of a pacemaker for tachycardia/bradycardia. He wishes to consider his options and will notify us of his decision. Would favor this option rather than AF ablation upfront due to his recent presentation with bradycardia/syncope and recurrent tachycardia.         FOLLOW-UP     1 month        Angela Mane MD  Cardiac Electrophysiology / Cardiology    [address], [address] [facility], [address]  [address], [facility]  [phone] / [phone] Fax   [phone] / [phone] Fax

## 2019-04-15 ENCOUNTER — TELEPHONE (OUTPATIENT)
Dept: CARDIOLOGY CLINIC | Age: 54
End: 2019-04-15

## 2019-04-15 DIAGNOSIS — I48.0 PAF (PAROXYSMAL ATRIAL FIBRILLATION) (HCC): ICD-10-CM

## 2019-04-15 DIAGNOSIS — I10 ESSENTIAL HYPERTENSION: Primary | Chronic | ICD-10-CM

## 2019-04-15 NOTE — TELEPHONE ENCOUNTER
Spoke with patient's girlfriend Ms. Keke Corbett. Very upset over medication changes as she was not aware amiodarone was started. States she cares for patient and noticed the changes on his AVS.  Patient has not started amiodarone. Was not here at 4/12 appointment. Requested appointment to speak with Dr Neisha Owens at earliest convenience. Scheduled.   Future Appointments   Date Time Provider Deisi Pearce   4/17/2019 11:40 AM Mariangel Oakley MD [facility]   5/15/2019  3:20 PM Mariangel Oakley MD [address] [place] [address]

## 2019-04-17 ENCOUNTER — OFFICE VISIT (OUTPATIENT)
Dept: CARDIOLOGY CLINIC | Age: 54
End: 2019-04-17

## 2019-04-17 VITALS
BODY MASS INDEX: 34.91 KG/M2 | OXYGEN SATURATION: 95 % | DIASTOLIC BLOOD PRESSURE: 70 MMHG | RESPIRATION RATE: 16 BRPM | HEART RATE: 72 BPM | HEIGHT: 74 IN | WEIGHT: 272 LBS | SYSTOLIC BLOOD PRESSURE: 110 MMHG

## 2019-04-17 DIAGNOSIS — R55 SYNCOPE, UNSPECIFIED SYNCOPE TYPE: Primary | ICD-10-CM

## 2019-04-17 DIAGNOSIS — I10 ESSENTIAL HYPERTENSION: Chronic | ICD-10-CM

## 2019-04-17 DIAGNOSIS — I48.0 PAF (PAROXYSMAL ATRIAL FIBRILLATION) (HCC): Primary | ICD-10-CM

## 2019-04-17 RX ORDER — HYDRALAZINE HYDROCHLORIDE 25 MG/1
37.5 TABLET, FILM COATED ORAL 3 TIMES DAILY
Qty: 63 TAB | Refills: 0 | Status: SHIPPED | OUTPATIENT
Start: 2019-04-17

## 2019-04-17 RX ORDER — HYDRALAZINE HYDROCHLORIDE 25 MG/1
37.5 TABLET, FILM COATED ORAL 3 TIMES DAILY
Qty: 405 TAB | Refills: 3 | Status: SHIPPED | OUTPATIENT
Start: 2019-04-17

## 2019-04-17 RX ORDER — AMLODIPINE BESYLATE 10 MG/1
TABLET ORAL DAILY
COMMUNITY

## 2019-04-17 NOTE — PROGRESS NOTES
HISTORY OF PRESENTING ILLNESS      Saimr Vidal is a 48 y.o. male presenting again to discuss PPM implantation recommendations from a recent visit with his significant other. Monitor tracings were once again reviewed and reasoning behind recommendation for pacemaker implantation recommendations were reviewed. Significant other reports patient never started amiodarone. ACTIVE PROBLEM LIST     Patient Active Problem List    Diagnosis Date Noted    Syncope 03/29/2019    PAF (paroxysmal atrial fibrillation) ([facility] 03/29/2019    Bradycardia 03/29/2019    Syncope and collapse 02/24/2019    New onset atrial flutter ([facility] 02/24/2019    [facility] 02/24/2019    HTN (hypertension) 02/24/2019           PAST MEDICAL HISTORY     Past Medical History:   Diagnosis Date    Hypertension     Palpitations            PAST SURGICAL HISTORY     History reviewed. No pertinent surgical history. ALLERGIES     No Known Allergies       FAMILY HISTORY     Family History   Problem Relation Age of Onset   [employer] [place] [employer] Hypertension Father     Hypertension Mother     negative for cardiac disease       SOCIAL HISTORY     Social History     Socioeconomic History    Marital status: SINGLE     Spouse name: Not on file    Number of children: Not on file    Years of education: Not on file    Highest education level: Not on file   Tobacco Use    Smoking status: Current Every Day Smoker     Packs/day: 0.50    Smokeless tobacco: Never Used    Tobacco comment: 1/2 - 1 pack per day   Substance and Sexual Activity    Alcohol use: No     Frequency: Never         MEDICATIONS     Current Outpatient Medications   Medication Sig    amLODIPine (NORVASC) 10 mg tablet Take  by mouth daily.  hydrALAZINE (APRESOLINE) 25 mg tablet Take 1.5 Tabs by mouth three (3) times daily.  lisinopril (PRINIVIL, ZESTRIL) 20 mg tablet Take 1 Tab by mouth daily for 30 days.     apixaban (ELIQUIS) 5 mg tablet Take 1 Tab by mouth two (2) times a day. No current facility-administered medications for this visit. I have reviewed the nurses notes, vitals, problem list, allergy list, medical history, family, social history and medications. REVIEW OF SYMPTOMS      General: Pt denies excessive weight gain or loss. Pt is able to conduct ADL's  HEENT: Denies blurred vision, headaches, hearing loss, epistaxis and difficulty swallowing. Respiratory: Denies cough, congestion, shortness of breath, CERNA, wheezing or stridor. Cardiovascular: Denies precordial pain, palpitations, edema or PND  Gastrointestinal: Denies poor appetite, indigestion, abdominal pain or blood in stool  Genitourinary: Denies hematuria, dysuria, increased urinary frequency  Musculoskeletal: Denies joint pain or swelling from muscles or joints  Neurologic: Denies tremor, paresthesias, headache, or sensory motor disturbance  Psychiatric: Denies confusion, insomnia, depression  Integumentray: Denies rash, itching or ulcers. Hematologic: Denies easy bruising, bleeding       PHYSICAL EXAMINATION      Vitals:    04/17/19 1205   BP: 110/70   Pulse: 72   Resp: 16   SpO2: 95%   Weight: 272 lb (123.4 kg)   Height: 6' 1.5\" (1.867 m)     General: Well developed, in no acute distress. HEENT: No jaundice, oral mucosa moist, no oral ulcers  Neck: Supple, no stiffness, no lymphadenopathy, supple  Heart:  Normal S1/S2 negative S3 or S4. Regular, no murmur, gallop or rub, no jugular venous distention  Respiratory: Clear bilaterally x 4, no wheezing or rales  Abdomen:   Soft, non-tender, bowel sounds are active.   Extremities:  No edema, normal cap refill, no cyanosis. Musculoskeletal: No clubbing, no deformities  Neuro: A&Ox3, speech clear, gait stable, cooperative, no focal neurologic deficits  Skin: Skin color is normal. No rashes or lesions.  Non diaphoretic, moist.  Vascular: 2+ pulses symmetric in all extremities       DIAGNOSTIC DATA      EKG:        LABORATORY DATA Lab Results   Component Value Date/Time    WBC 10.3 03/29/2019 01:14 AM    HGB 11.8 (L) 03/29/2019 01:14 AM    HCT 36.7 03/29/2019 01:14 AM    PLATELET 621 06/77/0119 01:14 AM    MCV 85.7 03/29/2019 01:14 AM      Lab Results   Component Value Date/Time    Sodium 136 03/29/2019 01:12 PM    Potassium HEMOLYZED,RECOLLECT REQUESTED 03/29/2019 01:12 PM    Chloride 104 03/29/2019 01:12 PM    CO2 23 03/29/2019 01:12 PM    Anion gap 9 03/29/2019 01:12 PM    Glucose 103 (H) 03/29/2019 01:12 PM    BUN 12 03/29/2019 01:12 PM    Creatinine 0.99 03/29/2019 01:12 PM    BUN/Creatinine ratio 12 03/29/2019 01:12 PM    GFR est AA >60 03/29/2019 01:12 PM    GFR est non-AA >60 03/29/2019 01:12 PM    Calcium 8.3 (L) 03/29/2019 01:12 PM    Bilirubin, total 0.3 03/29/2019 01:14 AM    AST (SGOT) 19 03/29/2019 01:14 AM    Alk. phosphatase 78 03/29/2019 01:14 AM    Protein, total 7.8 03/29/2019 01:14 AM    Albumin 3.1 (L) 03/29/2019 01:14 AM    Globulin 4.7 (H) 03/29/2019 01:14 AM    A-G Ratio 0.7 (L) 03/29/2019 01:14 AM    ALT (SGPT) 30 03/29/2019 01:14 AM           ASSESSMENT      1. Syncope, recurrent  2. Bradycardia  3. Atrial flutter  4. Hypertension  5. Obesity  6. Renal insufficiency  7. Fatigue  8. Atrial fibrillation       PLAN     Patient wishes to proceed with PPM implantation. Plan for dual-chamber pacemaker implantation with [address] with MAC anesthesia. We will plan to initiate rate controlling medications post implantation.        FOLLOW-UP     Post procedure          Delvin Mcgrath MD  Cardiac Electrophysiology / Cardiology    [address]  [address]  [phone] / [phone] Fax   [phone] / [phone] Fax

## 2019-04-17 NOTE — PROGRESS NOTES
Room # 6   Never started Amiodarone    Needs refills on Hydralazine please send 2 week supply to [facility] and [facility]    Visit Vitals  /70 (BP 1 Location: Left arm, BP Patient Position: Sitting)   Pulse 72   Resp 16   Ht 6' 1.5\" (1.867 m)   Wt 272 lb (123.4 kg)   SpO2 95%   BMI 35.40 kg/m²

## 2019-04-17 NOTE — TELEPHONE ENCOUNTER
Pharmacy confirmed. Patient states that they are completely out of medication and needs 2 weeks worth sent. He states that it was supposed to be sent 2 hours ago and it is not there. Please advise.     Phone #: [phone]  Thanks

## 2019-04-20 NOTE — DISCHARGE SUMMARY
Physician Discharge Summary     Patient ID:  Tyler Kaye  [id number]  56 y.o.  1965    Admit date: 3/29/2019    Discharge date and time: 3/29/2019     Admission Diagnoses: Syncope [R55]  Syncope [R55]    Discharge Diagnoses/Hospital Course   Syncope (3/29/2019), recurrent. Evaluated by EP. Etiology remains unclear. Suspect related to bradycardia and hypotension. Will need outpt autotrigger monitor. Coreg was discontinued. Will need f/u with EP in 2 weeks      RAJ (acute kidney injury) ([facility] (2/24/2019) - 1.67 on admission. With IVF's improved to 0.99. Likely IVVD.      HTN (hypertension) (2/24/2019) - see discussion above. Coreg has been stopped due to bradycardia. Hydralazine started in its place      PAF (paroxysmal atrial fibrillation) ([facility] (3/29/2019)/ Bradycardia (3/29/2019) - off coreg due to bradycardia.  On eliquis.      Obesity -  on weight loss      Marijuana use -  on weight loss     PCP: Other, Lily, MD      Consults: Cardiology and Pulmonary/Intensive care    Discharge Exam:  Visit Vitals  /86   Pulse 82   Temp 97.4 °F (36.3 °C)   Resp 23   Ht 6' 1.5\" (1.867 m)   Wt 124.4 kg (274 lb 4 oz)   SpO2 98%   BMI 35.69 kg/m²     Gen:  Obese,, in no acute distress  HEENT:  Pink conjunctivae, PERRL, hearing intact to voice, moist mucous membranes  Neck:  Supple, without masses, thyroid non-tender  Resp:  No accessory muscle use, clear breath sounds without wheezes rales or rhonchi  Card:  No murmurs, normal S1, S2 without thrills, bruits or peripheral edema  Abd:  Soft, non-tender, non-distended, normoactive bowel sounds are present, no palpable organomegaly and no detectable hernias  Lymph:  No cervical or inguinal adenopathy  Musc:  No cyanosis or clubbing  Skin:  No rashes or ulcers, skin turgor is good  Neuro:  Cranial nerves are grossly intact, no focal motor weakness, follows commands appropriately  Psych:  Good insight, oriented to person, place and time, alert    Disposition: home    Patient Instructions:   Discharge Medication List as of 3/29/2019  3:48 PM      START taking these medications    Details   hydrALAZINE (APRESOLINE) 25 mg tablet Take 1.5 Tabs by mouth three (3) times daily. , Print, Disp-90 Tab, R-0         CONTINUE these medications which have CHANGED    Details   lisinopril (PRINIVIL, ZESTRIL) 20 mg tablet Take 1 Tab by mouth daily for 30 days. , No Print, Disp-30 Tab, R-0         CONTINUE these medications which have NOT CHANGED    Details   amLODIPine (NORVASC) 10 mg tablet Take 1 Tab by mouth daily for 30 days. , Print, Disp-30 Tab, R-0      apixaban (ELIQUIS) 5 mg tablet Take 1 Tab by mouth two (2) times a day., Print, Disp-30 Tab, R-0         STOP taking these medications       carvedilol (COREG) 25 mg tablet Comments:   Reason for Stopping:               Activity: Activity as tolerated  Diet: Resume previous diet  Wound Care: None needed    Follow-up Information     Follow up With Specialties Details Why Contact Ame Alves MD Cardiology On 4/12/2019 1:40  [facility]  [address] [place] [address] [phone]      Other, MD Lily    Patient can only remember the practice name and not the physician            Approximate time spent in patient care on day of discharge: 35 minutes     Signed:  Jaylon Jones MD  4/20/2019  12:46 PM .

## 2019-04-23 ENCOUNTER — TELEPHONE (OUTPATIENT)
Dept: CARDIOLOGY CLINIC | Age: 54
End: 2019-04-23

## 2019-04-25 ENCOUNTER — TELEPHONE (OUTPATIENT)
Dept: CARDIOLOGY CLINIC | Age: 54
End: 2019-04-25

## 2019-04-25 ENCOUNTER — DOCUMENTATION ONLY (OUTPATIENT)
Dept: CARDIOLOGY CLINIC | Age: 54
End: 2019-04-25

## 2019-04-25 NOTE — TELEPHONE ENCOUNTER
Spoke with patient and his girlfriend on phone. Stated he is scheduled to see EP at the [place] for pacemaker implant. Requested for records to be sent in anticipation of appointment. Fax # [phone].

## 2021-08-03 PROBLEM — R55 SYNCOPE: Status: RESOLVED | Noted: 2019-03-29 | Resolved: 2021-08-03
